# Patient Record
Sex: FEMALE | Race: BLACK OR AFRICAN AMERICAN | NOT HISPANIC OR LATINO | Employment: UNEMPLOYED | URBAN - METROPOLITAN AREA
[De-identification: names, ages, dates, MRNs, and addresses within clinical notes are randomized per-mention and may not be internally consistent; named-entity substitution may affect disease eponyms.]

---

## 2018-08-15 ENCOUNTER — NURSE TRIAGE (OUTPATIENT)
Dept: ADMINISTRATIVE | Facility: CLINIC | Age: 23
End: 2018-08-15

## 2018-08-16 NOTE — TELEPHONE ENCOUNTER
"    Reason for Disposition   [1] SEVERE neck pain (e.g., excruciating, unable to do any normal activities) AND [2] not improved after 2 hours of pain medicine    Answer Assessment - Initial Assessment Questions  1. ONSET: "When did the pain begin?"       About 2 wks ago  2. LOCATION: "Where does it hurt?"       Right side   3. PATTERN "Does the pain come and go, or has it been constant since it started?"       intermittent  4. SEVERITY: "How bad is the pain?"  (Scale 1-10; or mild, moderate, severe)    - MILD (1-3): doesn't interfere with normal activities     - MODERATE (4-7): interferes with normal activities or awakens from sleep     - SEVERE (8-10):  excruciating pain, unable to do any normal activities       8/10 when she has pain- other times no pain  5. RADIATION: "Does the pain go anywhere else, shoot into your arms?"      No   6. CORD SYMPTOMS: "Any weakness or numbness of the arms or legs?"      none  7. CAUSE: "What do you think is causing the neck pain?"      Not sure- was on a cruise, day 3 felt like her neck needed to be popped - she has popped  Her neck in the past but this time no relief given  8. NECK OVERUSE: "Any recent activities that involved turning or twisting the neck?"      Not sure  9. OTHER SYMPTOMS: "Do you have any other symptoms?" (e.g., headache, fever, chest pain, difficulty breathing, neck swelling)      none  10. PREGNANCY: "Is there any chance you are pregnant?" "When was your last menstrual period?"        No  Pt reported no pcp- currently has severe pain.    Protocols used: ST NECK PAIN OR [id number]      "

## 2020-03-25 ENCOUNTER — TELEPHONE (OUTPATIENT)
Dept: OBSTETRICS AND GYNECOLOGY | Facility: CLINIC | Age: 25
End: 2020-03-25

## 2020-03-25 DIAGNOSIS — Z36.3 ENCOUNTER FOR ANTENATAL SCREENING FOR MALFORMATION USING ULTRASOUND: Primary | ICD-10-CM

## 2020-03-30 ENCOUNTER — TELEPHONE (OUTPATIENT)
Dept: OBSTETRICS AND GYNECOLOGY | Facility: CLINIC | Age: 25
End: 2020-03-30

## 2020-03-30 NOTE — TELEPHONE ENCOUNTER
----- Message from Nighat Dumont sent at 3/30/2020  3:51 PM CDT -----  Contact: BARRY ORANTES [[id number]]  Name of Who is Calling: BARRY ORANTES [[id number]]      What is the request in detail: Pt is calling to speak to staff in regards to an over the counter meds for vomiting  ... Please call to further assist .       Can the clinic reply by [facility]: N       What Number to Call Back [facility] not [facility]: [phone]

## 2020-03-31 ENCOUNTER — HOSPITAL ENCOUNTER (EMERGENCY)
Facility: OTHER | Age: 25
Discharge: HOME | End: 2020-04-01
Attending: EMERGENCY MEDICINE
Payer: MEDICAID

## 2020-03-31 DIAGNOSIS — E87.6 HYPOKALEMIA: ICD-10-CM

## 2020-03-31 DIAGNOSIS — O20.0 THREATENED MISCARRIAGE IN EARLY PREGNANCY: Primary | ICD-10-CM

## 2020-03-31 DIAGNOSIS — O30.001 TWIN GESTATION IN FIRST TRIMESTER, UNSPECIFIED MULTIPLE GESTATION TYPE: ICD-10-CM

## 2020-03-31 LAB
ABO + RH BLD: NORMAL
ALBUMIN SERPL BCP-MCNC: 4.2 G/DL (ref 3.5–5.2)
ALP SERPL-CCNC: 78 U/L (ref 55–135)
ALT SERPL W/O P-5'-P-CCNC: 16 U/L (ref 10–44)
ANION GAP SERPL CALC-SCNC: 11 MMOL/L (ref 8–16)
AST SERPL-CCNC: 18 U/L (ref 10–40)
B-HCG UR QL: POSITIVE
BASOPHILS # BLD AUTO: 0.02 K/UL (ref 0–0.2)
BASOPHILS NFR BLD: 0.3 % (ref 0–1.9)
BILIRUB SERPL-MCNC: 0.9 MG/DL (ref 0.1–1)
BUN SERPL-MCNC: 6 MG/DL (ref 6–20)
CALCIUM SERPL-MCNC: 9.9 MG/DL (ref 8.7–10.5)
CHLORIDE SERPL-SCNC: 104 MMOL/L (ref 95–110)
CO2 SERPL-SCNC: 23 MMOL/L (ref 23–29)
CREAT SERPL-MCNC: 0.7 MG/DL (ref 0.5–1.4)
CTP QC/QA: YES
DIFFERENTIAL METHOD: ABNORMAL
EOSINOPHIL # BLD AUTO: 0.1 K/UL (ref 0–0.5)
EOSINOPHIL NFR BLD: 1.1 % (ref 0–8)
ERYTHROCYTE [DISTWIDTH] IN BLOOD BY AUTOMATED COUNT: 12.4 % (ref 11.5–14.5)
EST. GFR  (AFRICAN AMERICAN): >60 ML/MIN/1.73 M^2
EST. GFR  (NON AFRICAN AMERICAN): >60 ML/MIN/1.73 M^2
GLUCOSE SERPL-MCNC: 89 MG/DL (ref 70–110)
HCG INTACT+B SERPL-ACNC: NORMAL MIU/ML
HCT VFR BLD AUTO: 44.9 % (ref 37–48.5)
HGB BLD-MCNC: 15.1 G/DL (ref 12–16)
IMM GRANULOCYTES # BLD AUTO: 0.01 K/UL (ref 0–0.04)
IMM GRANULOCYTES NFR BLD AUTO: 0.2 % (ref 0–0.5)
LYMPHOCYTES # BLD AUTO: 2.4 K/UL (ref 1–4.8)
LYMPHOCYTES NFR BLD: 35.8 % (ref 18–48)
MCH RBC QN AUTO: 30.1 PG (ref 27–31)
MCHC RBC AUTO-ENTMCNC: 33.6 G/DL (ref 32–36)
MCV RBC AUTO: 89 FL (ref 82–98)
MONOCYTES # BLD AUTO: 0.6 K/UL (ref 0.3–1)
MONOCYTES NFR BLD: 9.3 % (ref 4–15)
NEUTROPHILS # BLD AUTO: 3.5 K/UL (ref 1.8–7.7)
NEUTROPHILS NFR BLD: 53.3 % (ref 38–73)
NRBC BLD-RTO: 0 /100 WBC
PLATELET # BLD AUTO: 374 K/UL (ref 150–350)
PMV BLD AUTO: 9.1 FL (ref 9.2–12.9)
POTASSIUM SERPL-SCNC: 3.2 MMOL/L (ref 3.5–5.1)
PROT SERPL-MCNC: 8.4 G/DL (ref 6–8.4)
RBC # BLD AUTO: 5.02 M/UL (ref 4–5.4)
SODIUM SERPL-SCNC: 138 MMOL/L (ref 136–145)
WBC # BLD AUTO: 6.64 K/UL (ref 3.9–12.7)

## 2020-03-31 PROCEDURE — 96361 HYDRATE IV INFUSION ADD-ON: CPT

## 2020-03-31 PROCEDURE — 63600175 PHARM REV CODE 636 W HCPCS: Performed by: PHYSICIAN ASSISTANT

## 2020-03-31 PROCEDURE — 85025 COMPLETE CBC W/AUTO DIFF WBC: CPT

## 2020-03-31 PROCEDURE — 81025 URINE PREGNANCY TEST: CPT | Performed by: EMERGENCY MEDICINE

## 2020-03-31 PROCEDURE — 84702 CHORIONIC GONADOTROPIN TEST: CPT

## 2020-03-31 PROCEDURE — 80053 COMPREHEN METABOLIC PANEL: CPT

## 2020-03-31 PROCEDURE — 99284 EMERGENCY DEPT VISIT MOD MDM: CPT | Mod: 25

## 2020-03-31 PROCEDURE — 25000003 PHARM REV CODE 250: Performed by: PHYSICIAN ASSISTANT

## 2020-03-31 PROCEDURE — 96376 TX/PRO/DX INJ SAME DRUG ADON: CPT

## 2020-03-31 PROCEDURE — 96365 THER/PROPH/DIAG IV INF INIT: CPT

## 2020-03-31 PROCEDURE — 86901 BLOOD TYPING SEROLOGIC RH(D): CPT

## 2020-03-31 PROCEDURE — 96375 TX/PRO/DX INJ NEW DRUG ADDON: CPT

## 2020-03-31 RX ORDER — ONDANSETRON 2 MG/ML
4 INJECTION INTRAMUSCULAR; INTRAVENOUS
Status: COMPLETED | OUTPATIENT
Start: 2020-03-31 | End: 2020-03-31

## 2020-03-31 RX ADMIN — ONDANSETRON 4 MG: 2 INJECTION INTRAMUSCULAR; INTRAVENOUS at 09:03

## 2020-03-31 RX ADMIN — SODIUM CHLORIDE 1000 ML: 0.9 INJECTION, SOLUTION INTRAVENOUS at 09:03

## 2020-03-31 RX ADMIN — PROMETHAZINE HYDROCHLORIDE 25 MG: 25 INJECTION INTRAMUSCULAR; INTRAVENOUS at 10:03

## 2020-04-01 VITALS
OXYGEN SATURATION: 99 % | HEIGHT: 62 IN | RESPIRATION RATE: 18 BRPM | BODY MASS INDEX: 49.69 KG/M2 | DIASTOLIC BLOOD PRESSURE: 64 MMHG | HEART RATE: 86 BPM | SYSTOLIC BLOOD PRESSURE: 126 MMHG | WEIGHT: 270 LBS | TEMPERATURE: 98 F

## 2020-04-01 PROCEDURE — 96366 THER/PROPH/DIAG IV INF ADDON: CPT

## 2020-04-01 RX ORDER — PROMETHAZINE HYDROCHLORIDE 25 MG/1
25 SUPPOSITORY RECTAL EVERY 6 HOURS PRN
Qty: 12 SUPPOSITORY | Refills: 0 | Status: SHIPPED | OUTPATIENT
Start: 2020-04-01 | End: 2020-04-16

## 2020-04-01 NOTE — ED PROVIDER NOTES
Encounter Date: 3/31/2020       History     Chief Complaint   Patient presents with    Emesis During Pregnancy     x3 days. states unable to keep anything down. Taking zofran without relief. States 6 weeks pregnant    Vaginal Bleeding     since today     Patient is a 24-year-old female  approximately 6 weeks pregnant who presents to the emergency department with vaginal bleeding nausea and vomiting.  Patient states over the last several days she has had worsening nausea and vomiting.  She states she is vomiting several times a day.  She states today she began to have vaginal bleeding.  She states she has gone through 3 pads.  She denies abdominal pain or pelvic cramping.  She denies fevers or chills.  She denies urinary symptoms.    The history is provided by the patient.     Review of patient's allergies indicates:  No Known Allergies  Past Medical History:   Diagnosis Date    Anemia     Asthma complicating pregnancy, antepartum 2015     No past surgical history on file.  Family History   Problem Relation Age of Onset    Breast cancer Neg Hx     Colon cancer Neg Hx     Ovarian cancer Neg Hx      Social History     Tobacco Use    Smoking status: Never Smoker    Smokeless tobacco: Never Used   Substance Use Topics    Alcohol use: No    Drug use: No     Review of Systems   Constitutional: Positive for activity change, appetite change and fatigue. Negative for chills and fever.   HENT: Negative for congestion, ear discharge, ear pain, postnasal drip, rhinorrhea, sore throat and trouble swallowing.    Respiratory: Negative for cough and shortness of breath.    Cardiovascular: Negative for chest pain.   Gastrointestinal: Positive for nausea and vomiting. Negative for abdominal pain, blood in stool, constipation and diarrhea.   Genitourinary: Positive for vaginal bleeding. Negative for dysuria, flank pain, hematuria and vaginal discharge.   Musculoskeletal: Negative for back pain, neck pain and neck  stiffness.   Skin: Negative for rash and wound.   Neurological: Negative for dizziness, weakness, light-headedness and headaches.       Physical Exam     Initial Vitals [03/31/20 2028]   BP Pulse Resp Temp SpO2   117/76 85 16 98 °F (36.7 °C) (!) 94 %      MAP       --         Physical Exam    Nursing note and vitals reviewed.  Constitutional: She appears well-developed and well-nourished. She is not diaphoretic. She is Obese .  Non-toxic appearance. No distress.   HENT:   Head: Normocephalic.   Right Ear: Hearing and external ear normal.   Left Ear: Hearing and external ear normal.   Nose: Nose normal.   Mouth/Throat: Uvula is midline, oropharynx is clear and moist and mucous membranes are normal. No oropharyngeal exudate.   Eyes: Conjunctivae are normal. Pupils are equal, round, and reactive to light.   Neck: Normal range of motion.   Cardiovascular: Normal rate and regular rhythm.   Pulmonary/Chest: Breath sounds normal.   Abdominal: Soft. Bowel sounds are normal.   Genitourinary:   Genitourinary Comments: No blood in vault.  No CMT.  No adnexal tenderness.   Lymphadenopathy:     She has no cervical adenopathy.   Neurological: She is alert and oriented to person, place, and time.   Skin: Skin is warm and dry. Capillary refill takes less than 2 seconds.   Psychiatric: She has a normal mood and affect.         ED Course   Procedures  Labs Reviewed   CBC W/ AUTO DIFFERENTIAL - Abnormal; Notable for the following components:       Result Value    Platelets 374 (*)     MPV 9.1 (*)     All other components within normal limits   COMPREHENSIVE METABOLIC PANEL - Abnormal; Notable for the following components:    Potassium 3.2 (*)     All other components within normal limits   POCT URINE PREGNANCY - Abnormal; Notable for the following components:    POC Preg Test, Ur Positive (*)     All other components within normal limits   HCG, QUANTITATIVE, PREGNANCY   GROUP & RH          Imaging Results          US OB <14 Wks  TransAbd & TransVag, Twin Gestation (xpd) (Final result)  Result time 04/01/20 00:10:34   Procedure changed from US OB <14 Wks TransAbd & TransVag, Single Gestation (XPD)     Final result by Jose Brennan MD (04/01/20 00:10:34)                 Impression:      Early twin intrauterine pregnancy noted, as discussed above, continued follow-up is recommended.      Electronically signed by: Jose Brennan  Date:    04/01/2020  Time:    00:10             Narrative:    EXAMINATION:  US OB <14 WEEKS TRANSABDOM & TRANSVAG, TWIN GESTATION (XPD)    CLINICAL HISTORY:  Vag Bleeding;    TECHNIQUE:  Transabdominal and transvaginal obstetrical ultrasound was performed.    COMPARISON:  None    FINDINGS:  The technologist indicates the examination was technically difficult due to patient body habitus.  The uterus measures approximately 11.8 cm in length.  On submitted images there is a small hypoechoic focus at the cervix that may relate to a small nabothian cyst.  Twin intrauterine pregnancy is noted.  Two gestational sacs are noted.  Gestational sac A demonstrates a mean sac diameter of 0.93 cm corresponding to a calculated gestational age of 5 weeks 4 days, a yolk sac is noted, a fetal pole not well delineated at this time.  Gestational sac B demonstrates a mean sac diameter of 1.4 cm corresponding to a calculated gestational age of 6 weeks 2 days, fetal pole is noted with a crown-rump length measurement corresponding to approximate gestational age of 5 weeks 6 days.  Fetal heart rate is not well delineated on M-mode imaging however the technologist indicates identification of fetal heart rate of baby B on real-time imaging.    The maternal right ovary measures approximately 2.9 x 3.6 x 3.4 cm in size, a small follicle is noted.  The left ovary measures approximately 1.4 x 3.4 x 2.2 cm in size.  Color Doppler imaging demonstrates evidence for flow to both ovaries.  There is no additional sonographic evidence for pelvic mass  or cystic collection.                                 Medical Decision Making:   Initial Assessment:   Urgent evaluation of a 24-year-old female  approximately 6 weeks pregnant who presents to the emergency department with nausea and vomiting and vaginal bleeding.  Patient is afebrile, nontoxic appearing, and hemodynamically stable.  Benign abdominal exam.  Labs will be obtained to evaluate kidney function and electrolytes.  Patient will be given IV hydration and antiemetics.  Will also perform ectopic workup to ensure no ectopic pregnancy with patient's vaginal bleeding.  Clinical Tests:   Lab Tests: Ordered and Reviewed  Radiological Study: Ordered and Reviewed  ED Management:  12:18 AM  Patient is Rh positive, so RhoGAM is not warranted.  On pelvic exam, there is no blood in vaginal vault.  Cervix is closed.  No products of conception.  Pt is slightly hypokalemic.  Will advise to replace orally outpatient with dietary changes. All other labs are unremarkable.  Ultrasound reveals early twin IUP.  Patient is given bleeding precautions.  She is advised to follow up with OBGYN at scheduled appointment in April.  Patient is advised to return to the emergency department with any worsening symptoms or concerns.                                 Clinical Impression:       ICD-10-CM ICD-9-CM   1. Threatened miscarriage in early pregnancy O20.0 640.03   2. Twin gestation in first trimester, unspecified multiple gestation type O30.001 651.03   3. Hypokalemia E87.6 276.8             ED Disposition Condition    Discharge Stable        ED Prescriptions     None        Follow-up Information    None                                    Letty Dennis PA-C  20 0020       Letty Dennis PA-C  20 0021

## 2020-04-01 NOTE — ED TRIAGE NOTES
Pt to ED with c/o emesis x3 days and vaginal bleeding that began today.  Pt reports 6 weeks pregnant [facility] in mid February pt took pregnancy test 3 days reports +test.  Pt states this is her 3rd pregnancy with one prior miscarriage.  Reports vaginal bleeding is red in color and soaking through 3 pads today.  Pt reports taking Zofran at home with no relief of vomiting.  Pt denies any other symptoms at this time.     Patient identifiers for Lilyia Moore checked and correct   LOC: Patient is awake, alert, and aware of environment with an appropriate affect.  Patient is oriented x4 and speaking appropriately.  APPEARANCE: Patient resting comfortably and in no acute distress.  Patient is clean and well groomed, patient's clothing is properly fastened.  SKIN: The skin is warm and dry.  Patient has normal skin turgor and moist mucus membranes.  Skin is intact: no bruising or breakdown noted.  MUSCULOSKELETAL: Patient is moving all extremities well, no obvious swelling or deformities noted. Pulses intact.  RESPIRATORY: Airway is open and patent.  Respirations are spontaneous, even and unlabored.  Normal effort and rate noted.  CARDIAC: Patient has a normal rate and rhythm.  No peripheral edema noted.  Capillary refill < 3 seconds.  ABDOMEN: No abd distention noted.  Bowel sounds active in all 4 quadrants.  Soft and non-tender upon palpation.  NEUROLOGICAL: PERRLA.  Facial expression is symmetrical.  Hand grasps are equal bilaterally.  Normal sensation in all extremities when touched with finger.  Following commands appropriately.

## 2020-04-08 ENCOUNTER — HOSPITAL ENCOUNTER (EMERGENCY)
Facility: OTHER | Age: 25
Discharge: HOME | End: 2020-04-08
Attending: EMERGENCY MEDICINE
Payer: MEDICAID

## 2020-04-08 ENCOUNTER — NURSE TRIAGE (OUTPATIENT)
Dept: ADMINISTRATIVE | Facility: CLINIC | Age: 25
End: 2020-04-08

## 2020-04-08 ENCOUNTER — TELEPHONE (OUTPATIENT)
Dept: OBSTETRICS AND GYNECOLOGY | Facility: CLINIC | Age: 25
End: 2020-04-08

## 2020-04-08 VITALS
WEIGHT: 275 LBS | RESPIRATION RATE: 18 BRPM | OXYGEN SATURATION: 99 % | TEMPERATURE: 99 F | BODY MASS INDEX: 50.61 KG/M2 | HEIGHT: 62 IN | HEART RATE: 84 BPM | DIASTOLIC BLOOD PRESSURE: 55 MMHG | SYSTOLIC BLOOD PRESSURE: 125 MMHG

## 2020-04-08 DIAGNOSIS — E86.0 DEHYDRATION: ICD-10-CM

## 2020-04-08 DIAGNOSIS — O21.9 NAUSEA AND VOMITING DURING PREGNANCY: Primary | ICD-10-CM

## 2020-04-08 DIAGNOSIS — O21.9 NAUSEA/VOMITING IN PREGNANCY: Primary | ICD-10-CM

## 2020-04-08 LAB
ALBUMIN SERPL BCP-MCNC: 3.8 G/DL (ref 3.5–5.2)
ALP SERPL-CCNC: 64 U/L (ref 55–135)
ALT SERPL W/O P-5'-P-CCNC: 29 U/L (ref 10–44)
ANION GAP SERPL CALC-SCNC: 10 MMOL/L (ref 8–16)
AST SERPL-CCNC: 18 U/L (ref 10–40)
BASOPHILS # BLD AUTO: 0.01 K/UL (ref 0–0.2)
BASOPHILS NFR BLD: 0.1 % (ref 0–1.9)
BILIRUB SERPL-MCNC: 0.6 MG/DL (ref 0.1–1)
BILIRUB UR QL STRIP: NEGATIVE
BUN SERPL-MCNC: 5 MG/DL (ref 6–20)
CALCIUM SERPL-MCNC: 9.4 MG/DL (ref 8.7–10.5)
CHLORIDE SERPL-SCNC: 104 MMOL/L (ref 95–110)
CLARITY UR: CLEAR
CO2 SERPL-SCNC: 24 MMOL/L (ref 23–29)
COLOR UR: YELLOW
CREAT SERPL-MCNC: 0.7 MG/DL (ref 0.5–1.4)
DIFFERENTIAL METHOD: ABNORMAL
EOSINOPHIL # BLD AUTO: 0 K/UL (ref 0–0.5)
EOSINOPHIL NFR BLD: 0.4 % (ref 0–8)
ERYTHROCYTE [DISTWIDTH] IN BLOOD BY AUTOMATED COUNT: 12 % (ref 11.5–14.5)
EST. GFR  (AFRICAN AMERICAN): >60 ML/MIN/1.73 M^2
EST. GFR  (NON AFRICAN AMERICAN): >60 ML/MIN/1.73 M^2
GLUCOSE SERPL-MCNC: 84 MG/DL (ref 70–110)
GLUCOSE UR QL STRIP: NEGATIVE
HCT VFR BLD AUTO: 39.4 % (ref 37–48.5)
HGB BLD-MCNC: 13.4 G/DL (ref 12–16)
HGB UR QL STRIP: NEGATIVE
IMM GRANULOCYTES # BLD AUTO: 0.01 K/UL (ref 0–0.04)
IMM GRANULOCYTES NFR BLD AUTO: 0.1 % (ref 0–0.5)
KETONES UR QL STRIP: ABNORMAL
LEUKOCYTE ESTERASE UR QL STRIP: NEGATIVE
LIPASE SERPL-CCNC: 32 U/L (ref 4–60)
LYMPHOCYTES # BLD AUTO: 1.5 K/UL (ref 1–4.8)
LYMPHOCYTES NFR BLD: 21.3 % (ref 18–48)
MCH RBC QN AUTO: 30 PG (ref 27–31)
MCHC RBC AUTO-ENTMCNC: 34 G/DL (ref 32–36)
MCV RBC AUTO: 88 FL (ref 82–98)
MONOCYTES # BLD AUTO: 0.7 K/UL (ref 0.3–1)
MONOCYTES NFR BLD: 9.2 % (ref 4–15)
NEUTROPHILS # BLD AUTO: 4.8 K/UL (ref 1.8–7.7)
NEUTROPHILS NFR BLD: 68.9 % (ref 38–73)
NITRITE UR QL STRIP: NEGATIVE
NRBC BLD-RTO: 0 /100 WBC
PH UR STRIP: 6 [PH] (ref 5–8)
PLATELET # BLD AUTO: 311 K/UL (ref 150–350)
PMV BLD AUTO: 9.1 FL (ref 9.2–12.9)
POTASSIUM SERPL-SCNC: 3.5 MMOL/L (ref 3.5–5.1)
PROT SERPL-MCNC: 7.6 G/DL (ref 6–8.4)
PROT UR QL STRIP: NEGATIVE
RBC # BLD AUTO: 4.46 M/UL (ref 4–5.4)
SODIUM SERPL-SCNC: 138 MMOL/L (ref 136–145)
SP GR UR STRIP: 1.02 (ref 1–1.03)
URN SPEC COLLECT METH UR: ABNORMAL
UROBILINOGEN UR STRIP-ACNC: NEGATIVE EU/DL
WBC # BLD AUTO: 7.03 K/UL (ref 3.9–12.7)

## 2020-04-08 PROCEDURE — 85025 COMPLETE CBC W/AUTO DIFF WBC: CPT

## 2020-04-08 PROCEDURE — 83690 ASSAY OF LIPASE: CPT

## 2020-04-08 PROCEDURE — 99284 EMERGENCY DEPT VISIT MOD MDM: CPT | Mod: 25

## 2020-04-08 PROCEDURE — 96374 THER/PROPH/DIAG INJ IV PUSH: CPT

## 2020-04-08 PROCEDURE — 81003 URINALYSIS AUTO W/O SCOPE: CPT

## 2020-04-08 PROCEDURE — 96375 TX/PRO/DX INJ NEW DRUG ADDON: CPT

## 2020-04-08 PROCEDURE — 80053 COMPREHEN METABOLIC PANEL: CPT

## 2020-04-08 PROCEDURE — 25000003 PHARM REV CODE 250: Performed by: PHYSICIAN ASSISTANT

## 2020-04-08 PROCEDURE — 63600175 PHARM REV CODE 636 W HCPCS: Performed by: PHYSICIAN ASSISTANT

## 2020-04-08 PROCEDURE — S0028 INJECTION, FAMOTIDINE, 20 MG: HCPCS | Performed by: PHYSICIAN ASSISTANT

## 2020-04-08 PROCEDURE — 96361 HYDRATE IV INFUSION ADD-ON: CPT

## 2020-04-08 RX ORDER — DIPHENHYDRAMINE HYDROCHLORIDE 50 MG/ML
12.5 INJECTION INTRAMUSCULAR; INTRAVENOUS
Status: COMPLETED | OUTPATIENT
Start: 2020-04-08 | End: 2020-04-08

## 2020-04-08 RX ORDER — METOCLOPRAMIDE HYDROCHLORIDE 5 MG/ML
10 INJECTION INTRAMUSCULAR; INTRAVENOUS
Status: COMPLETED | OUTPATIENT
Start: 2020-04-08 | End: 2020-04-08

## 2020-04-08 RX ORDER — METOCLOPRAMIDE 10 MG/1
10 TABLET ORAL EVERY 6 HOURS PRN
Qty: 18 TABLET | Refills: 0 | Status: SHIPPED | OUTPATIENT
Start: 2020-04-08 | End: 2020-04-16 | Stop reason: SDUPTHER

## 2020-04-08 RX ORDER — FAMOTIDINE 10 MG/ML
20 INJECTION INTRAVENOUS
Status: COMPLETED | OUTPATIENT
Start: 2020-04-08 | End: 2020-04-08

## 2020-04-08 RX ORDER — PROMETHAZINE HYDROCHLORIDE 25 MG/1
25 SUPPOSITORY RECTAL EVERY 6 HOURS PRN
Qty: 12 SUPPOSITORY | Refills: 1 | Status: SHIPPED | OUTPATIENT
Start: 2020-04-08 | End: 2020-04-16

## 2020-04-08 RX ADMIN — FAMOTIDINE 20 MG: 10 INJECTION INTRAVENOUS at 06:04

## 2020-04-08 RX ADMIN — DIPHENHYDRAMINE HYDROCHLORIDE 12.5 MG: 50 INJECTION, SOLUTION INTRAMUSCULAR; INTRAVENOUS at 06:04

## 2020-04-08 RX ADMIN — SODIUM CHLORIDE 1000 ML: 0.9 INJECTION, SOLUTION INTRAVENOUS at 07:04

## 2020-04-08 RX ADMIN — METOCLOPRAMIDE 10 MG: 5 INJECTION, SOLUTION INTRAMUSCULAR; INTRAVENOUS at 06:04

## 2020-04-08 NOTE — TELEPHONE ENCOUNTER
----- Message from Trena Funes NP sent at 4/8/2020  2:45 PM CDT -----  Phenergan suppositories sent. If she cannot keep anything down with this - will need to go to EDfor IV hydration.

## 2020-04-08 NOTE — ED NOTES
"Pt reporting she is 7 weeks pregnant, "they told me it was twins last time I was here"; c/o weakness, nausea, vomiting x several days. Has tried taking b6 and Zofran orally and phenergan rectal suppository without relief. No active vomiting at this time. Denies vaginal bleeding or abd cramping. Pt AAOx4 and appropriate at this time. Respirations even and unlabored. No acute distress noted.      "

## 2020-04-08 NOTE — ED PROVIDER NOTES
"Encounter Date: 2020       History     Chief Complaint   Patient presents with    Emesis During Pregnancy     7 weeks pregnant, nausea, vomiting x several days, states 'zofran, b6 is not working." Denies vag bleeding or abd cramping.      Patient is a 24-year-old  female, approximately 7 weeks patient age, with anemia and asthma presenting to the emergency department with vomiting.  Patient reports she has had intractable nausea and vomiting for the past 3 days.  She has tried vitamin B6, promethazine (oral and suppository) and Zofran without relief.  She is not even able to tolerate water.  She reports mild upper abdominal discomfort after vomiting.  She denies fever, diarrhea, cough, or shortness of breath.  She denies dysuria, vaginal bleeding or pelvic pain.    The history is provided by the patient.     Review of patient's allergies indicates:  No Known Allergies  Past Medical History:   Diagnosis Date    Anemia     Asthma complicating pregnancy, antepartum 2015     No past surgical history on file.  Family History   Problem Relation Age of Onset    Breast cancer Neg Hx     Colon cancer Neg Hx     Ovarian cancer Neg Hx      Social History     Tobacco Use    Smoking status: Never Smoker    Smokeless tobacco: Never Used   Substance Use Topics    Alcohol use: No    Drug use: No     Review of Systems   Constitutional: Positive for fatigue (And generalized weakness). Negative for chills and fever.   HENT: Negative for congestion and sore throat.    Respiratory: Negative for cough.    Cardiovascular: Negative for chest pain.   Gastrointestinal: Positive for nausea and vomiting. Negative for abdominal pain and diarrhea.   Genitourinary: Negative for dysuria, pelvic pain and vaginal bleeding.   Musculoskeletal: Negative for arthralgias.   Skin: Negative for rash.   Allergic/Immunologic: Negative for immunocompromised state.   Neurological: Negative for headaches.       Physical Exam     Initial " Vitals [04/08/20 1808]   BP Pulse Resp Temp SpO2   135/71 86 20 98.3 °F (36.8 °C) 97 %      MAP       --         Physical Exam    Constitutional: Vital signs are normal. She is Obese . She is cooperative.  Non-toxic appearance. She does not have a sickly appearance. No distress.   HENT:   Head: Normocephalic and atraumatic.   Mildly dry oral mucosa   Eyes: Conjunctivae and EOM are normal.   Neck: Normal range of motion. Neck supple.   Cardiovascular: Normal rate, regular rhythm and intact distal pulses.   Pulmonary/Chest: Breath sounds normal. No respiratory distress. She has no wheezes. She has no rales.   Abdominal: Soft. Bowel sounds are normal. There is no tenderness.   Musculoskeletal: Normal range of motion.   Neurological: She is alert and oriented to person, place, and time. GCS eye subscore is 4. GCS verbal subscore is 5. GCS motor subscore is 6.   Skin: Skin is warm and dry. No rash noted.         ED Course   Procedures  Labs Reviewed   CBC W/ AUTO DIFFERENTIAL - Abnormal; Notable for the following components:       Result Value    MPV 9.1 (*)     All other components within normal limits   COMPREHENSIVE METABOLIC PANEL - Abnormal; Notable for the following components:    BUN, Bld 5 (*)     All other components within normal limits   URINALYSIS, REFLEX TO URINE CULTURE - Abnormal; Notable for the following components:    Ketones, UA 2+ (*)     All other components within normal limits    Narrative:     Preferred Collection Type->Urine, Clean Catch   LIPASE          Imaging Results    None          Medical Decision Making:   History:   Old Records Summarized: other records.       <> Summary of Records: ED visit on 04/01/2020:  Ob ultrasound less than 14 weeks revealed Early twin intrauterine pregnancy noted.  Initial Assessment:   Urgent evaluation of a 24 y.o. female presenting to the emergency department complaining of vomiting during pregnancy. Patient is afebrile, nontoxic appearing and hemodynamically  stable.  Patient with stable vital signs.  She appears mildly dry on exam.  Has tried multiple antiemetics at home without relief.  Will provide patient with IV fluids, IV antiemetics and reassess.  ED Management:  Blood work reveals no acute metabolic disturbance.  Urinalysis reveals no infection, notable for 2+ ketones.   Patient's symptoms improved.  She tolerated p.o. without difficulty.  She did not want to stay for 2nd L of IV fluids.  She will be sent home with Reglan.  Patient was given strict return precautions to the emergency department.                                 Clinical Impression:     1. Nausea and vomiting during pregnancy    2. Dehydration                               Ja Vines PA-C  04/08/20 8519

## 2020-04-08 NOTE — TELEPHONE ENCOUNTER
----- Message from Hortencia Cameron MA sent at 4/8/2020  1:59 PM CDT -----  Patient states she is having trouble keeping any foods or liquids down. Has tried Zofran, Phenergan, and all other options in A To Z book. Will like to know if there is anything else she can do.  ----- Message -----  From: Blessing Villarreal  Sent: 4/8/2020   1:33 PM CDT  To: [facility] RAMIREZ Staff    Type :  Needs Medical Advice    Who Called: Keondria  Symptoms (please be specific): pt not able to keep any food down    How long has patient had these symptoms:  3 days  Pharmacy name and phone #:  no  Would the patient rather a call back or a response via [facility]? call  Best Call Back Number: [phone]  Additional Information: pt need an appt or a call

## 2020-04-08 NOTE — TELEPHONE ENCOUNTER
----- Message from Hortencia Cameron MA sent at 4/8/2020  1:59 PM CDT -----  Patient states she is having trouble keeping any foods or liquids down. Has tried Zofran, Phenergan, and all other options in A To Z book. Will like to know if there is anything else she can do.  ----- Message -----  From: Blessing Villarreal  Sent: 4/8/2020   1:33 PM CDT  To: [facility] Staff    Type :  Needs Medical Advice    Who Called: Keondria  Symptoms (please be specific): pt not able to keep any food down    How long has patient had these symptoms:  3 days  Pharmacy name and phone #:  no  Would the patient rather a call back or a response via [facility]? call  Best Call Back Number: [phone]  Additional Information: pt need an appt or a call

## 2020-04-09 NOTE — ED NOTES
Pt given 1 cup of water for PO challenge per PA request.  Pt was instructed to drink water over about 10-15 minutes.

## 2020-04-09 NOTE — ED NOTES
Received report from SURAJ Peres. Assumed care of patient at this time. Patient lying on stretcher with HOB elevated.  Pt updated on POC. RR even and unlabored. No acute distress noted.  Bed in lowest position, call light within reach.  Will continue to monitor.

## 2020-04-09 NOTE — ED NOTES
Pt rounding complete.  Pain 3/10.  Restroom and comfort needs addressed.  Pt updated on plan of care.  Call light within reach.  Will continue to monitor. Pt states nausea is relieved. Pt not requesting anything medication at this time.

## 2020-04-16 ENCOUNTER — OFFICE VISIT (OUTPATIENT)
Dept: OBSTETRICS AND GYNECOLOGY | Facility: CLINIC | Age: 25
End: 2020-04-16
Payer: MEDICAID

## 2020-04-16 ENCOUNTER — LAB VISIT (OUTPATIENT)
Dept: LAB | Facility: OTHER | Age: 25
End: 2020-04-16
Attending: NURSE PRACTITIONER
Payer: MEDICAID

## 2020-04-16 ENCOUNTER — PROCEDURE VISIT (OUTPATIENT)
Dept: OBSTETRICS AND GYNECOLOGY | Facility: CLINIC | Age: 25
End: 2020-04-16
Attending: OBSTETRICS & GYNECOLOGY
Payer: MEDICAID

## 2020-04-16 ENCOUNTER — PATIENT MESSAGE (OUTPATIENT)
Dept: OBSTETRICS AND GYNECOLOGY | Facility: CLINIC | Age: 25
End: 2020-04-16

## 2020-04-16 ENCOUNTER — TELEPHONE (OUTPATIENT)
Dept: OBSTETRICS AND GYNECOLOGY | Facility: CLINIC | Age: 25
End: 2020-04-16

## 2020-04-16 VITALS
HEIGHT: 62 IN | BODY MASS INDEX: 49.49 KG/M2 | SYSTOLIC BLOOD PRESSURE: 118 MMHG | WEIGHT: 268.94 LBS | DIASTOLIC BLOOD PRESSURE: 76 MMHG

## 2020-04-16 DIAGNOSIS — O21.9 NAUSEA/VOMITING IN PREGNANCY: ICD-10-CM

## 2020-04-16 DIAGNOSIS — O99.519 ASTHMA DURING PREGNANCY: ICD-10-CM

## 2020-04-16 DIAGNOSIS — Z34.80 SUPERVISION OF OTHER NORMAL PREGNANCY: ICD-10-CM

## 2020-04-16 DIAGNOSIS — Z34.80 SUPERVISION OF OTHER NORMAL PREGNANCY: Primary | ICD-10-CM

## 2020-04-16 DIAGNOSIS — O30.041 DICHORIONIC DIAMNIOTIC TWIN PREGNANCY IN FIRST TRIMESTER: ICD-10-CM

## 2020-04-16 DIAGNOSIS — J45.909 ASTHMA DURING PREGNANCY: ICD-10-CM

## 2020-04-16 DIAGNOSIS — Z36.3 ENCOUNTER FOR ANTENATAL SCREENING FOR MALFORMATION USING ULTRASOUND: ICD-10-CM

## 2020-04-16 DIAGNOSIS — Z32.01 POSITIVE PREGNANCY TEST: ICD-10-CM

## 2020-04-16 LAB
ABO + RH BLD: NORMAL
B-HCG UR QL: POSITIVE
BASOPHILS # BLD AUTO: 0.01 K/UL (ref 0–0.2)
BASOPHILS NFR BLD: 0.1 % (ref 0–1.9)
BLD GP AB SCN CELLS X3 SERPL QL: NORMAL
CTP QC/QA: YES
DIFFERENTIAL METHOD: ABNORMAL
EOSINOPHIL # BLD AUTO: 0.1 K/UL (ref 0–0.5)
EOSINOPHIL NFR BLD: 1.1 % (ref 0–8)
ERYTHROCYTE [DISTWIDTH] IN BLOOD BY AUTOMATED COUNT: 11.9 % (ref 11.5–14.5)
HCT VFR BLD AUTO: 40 % (ref 37–48.5)
HGB BLD-MCNC: 13.3 G/DL (ref 12–16)
IMM GRANULOCYTES # BLD AUTO: 0.02 K/UL (ref 0–0.04)
IMM GRANULOCYTES NFR BLD AUTO: 0.3 % (ref 0–0.5)
LYMPHOCYTES # BLD AUTO: 1.7 K/UL (ref 1–4.8)
LYMPHOCYTES NFR BLD: 22.9 % (ref 18–48)
MCH RBC QN AUTO: 29.2 PG (ref 27–31)
MCHC RBC AUTO-ENTMCNC: 33.3 G/DL (ref 32–36)
MCV RBC AUTO: 88 FL (ref 82–98)
MONOCYTES # BLD AUTO: 0.9 K/UL (ref 0.3–1)
MONOCYTES NFR BLD: 12.5 % (ref 4–15)
NEUTROPHILS # BLD AUTO: 4.7 K/UL (ref 1.8–7.7)
NEUTROPHILS NFR BLD: 63.1 % (ref 38–73)
NRBC BLD-RTO: 0 /100 WBC
PLATELET # BLD AUTO: 376 K/UL (ref 150–350)
PMV BLD AUTO: 9.3 FL (ref 9.2–12.9)
RBC # BLD AUTO: 4.55 M/UL (ref 4–5.4)
RPR SER QL: NORMAL
WBC # BLD AUTO: 7.46 K/UL (ref 3.9–12.7)

## 2020-04-16 PROCEDURE — 76801 OB US < 14 WKS SINGLE FETUS: CPT | Mod: 26,S$PBB,, | Performed by: OBSTETRICS & GYNECOLOGY

## 2020-04-16 PROCEDURE — 87481 CANDIDA DNA AMP PROBE: CPT | Mod: 59

## 2020-04-16 PROCEDURE — 99203 PR OFFICE/OUTPT VISIT, NEW, LEVL III, 30-44 MIN: ICD-10-PCS | Mod: TH,S$PBB,, | Performed by: NURSE PRACTITIONER

## 2020-04-16 PROCEDURE — 36415 COLL VENOUS BLD VENIPUNCTURE: CPT

## 2020-04-16 PROCEDURE — 87661 TRICHOMONAS VAGINALIS AMPLIF: CPT

## 2020-04-16 PROCEDURE — 99999 PR PBB SHADOW E&M-EST. PATIENT-LVL III: ICD-10-PCS | Mod: PBBFAC,,, | Performed by: NURSE PRACTITIONER

## 2020-04-16 PROCEDURE — 86703 HIV-1/HIV-2 1 RESULT ANTBDY: CPT

## 2020-04-16 PROCEDURE — 87086 URINE CULTURE/COLONY COUNT: CPT

## 2020-04-16 PROCEDURE — 99999 PR PBB SHADOW E&M-EST. PATIENT-LVL III: CPT | Mod: PBBFAC,,, | Performed by: NURSE PRACTITIONER

## 2020-04-16 PROCEDURE — 86901 BLOOD TYPING SEROLOGIC RH(D): CPT

## 2020-04-16 PROCEDURE — 86762 RUBELLA ANTIBODY: CPT

## 2020-04-16 PROCEDURE — 76802 OB US < 14 WKS ADDL FETUS: CPT | Mod: PBBFAC | Performed by: OBSTETRICS & GYNECOLOGY

## 2020-04-16 PROCEDURE — 76801 OB US < 14 WKS SINGLE FETUS: CPT | Mod: PBBFAC | Performed by: OBSTETRICS & GYNECOLOGY

## 2020-04-16 PROCEDURE — 85025 COMPLETE CBC W/AUTO DIFF WBC: CPT

## 2020-04-16 PROCEDURE — 76802 OB US < 14 WKS ADDL FETUS: CPT | Mod: 26,S$PBB,, | Performed by: OBSTETRICS & GYNECOLOGY

## 2020-04-16 PROCEDURE — 99203 OFFICE O/P NEW LOW 30 MIN: CPT | Mod: TH,S$PBB,, | Performed by: NURSE PRACTITIONER

## 2020-04-16 PROCEDURE — 99213 OFFICE O/P EST LOW 20 MIN: CPT | Mod: PBBFAC,TH,25 | Performed by: NURSE PRACTITIONER

## 2020-04-16 PROCEDURE — 87340 HEPATITIS B SURFACE AG IA: CPT

## 2020-04-16 PROCEDURE — 81025 URINE PREGNANCY TEST: CPT | Mod: PBBFAC | Performed by: NURSE PRACTITIONER

## 2020-04-16 PROCEDURE — 86592 SYPHILIS TEST NON-TREP QUAL: CPT

## 2020-04-16 PROCEDURE — 87491 CHLMYD TRACH DNA AMP PROBE: CPT | Mod: 59

## 2020-04-16 PROCEDURE — 76802 PR US, OB <14WKS, TRANSABD, EA ADDL GESTATION: ICD-10-PCS | Mod: 26,S$PBB,, | Performed by: OBSTETRICS & GYNECOLOGY

## 2020-04-16 PROCEDURE — 76801 PR US, OB <14WKS, TRANSABD, SINGLE GESTATION: ICD-10-PCS | Mod: 26,S$PBB,, | Performed by: OBSTETRICS & GYNECOLOGY

## 2020-04-16 RX ORDER — .BETA.-CAROTENE, ASCORBIC ACID, CHOLECALCIFEROL, .ALPHA.-TOCOPHEROL ACETATE, DL-, THIAMINE, RIBOFLAVIN, NIACINAMIDE, PYRIDOXINE HYDROCHLORIDE, FOLIC ACID, CYANOCOBALAMIN, CALCIUM PANTOTHENATE, CALCIUM CARBONATE, FERROUS FUMARATE, ZINC OXIDE AND DOCUSATE SODIUM 1000; 100; 400; 30; 3; 3; 15; 20; 1; 12; 7; 200; 29; 20; 25 [IU]/1; MG/1; [IU]/1; MG/1; MG/1; MG/1; MG/1; MG/1; MG/1; UG/1; MG/1; MG/1; MG/1; MG/1; MG/1
TABLET ORAL
COMMUNITY
Start: 2020-04-01 | End: 2020-06-28 | Stop reason: CLARIF

## 2020-04-16 RX ORDER — ALBUTEROL SULFATE 90 UG/1
2 AEROSOL, METERED RESPIRATORY (INHALATION) EVERY 6 HOURS PRN
Qty: 18 G | Refills: 0 | Status: SHIPPED | OUTPATIENT
Start: 2020-04-16 | End: 2020-05-14

## 2020-04-16 RX ORDER — METOCLOPRAMIDE 10 MG/1
10 TABLET ORAL EVERY 6 HOURS PRN
Qty: 18 TABLET | Refills: 0 | Status: SHIPPED | OUTPATIENT
Start: 2020-04-16 | End: 2020-04-17 | Stop reason: SDUPTHER

## 2020-04-16 NOTE — PROGRESS NOTES
"  CC: Positive Pregnancy Test    HISTORY OF PRESENT ILLNESS:    Liliya Moore is a 24 y.o. female, ,  Presents today for a routine exam complaining of amenorrhea and positive home urine pregnancy test.  Patient's last menstrual period was 2020 (lmp unknown).   She is not currently on any contraception.  Reports nausea and vomiting. Reports breast tenderness. Denies vaginal bleeding and pelvic pain.  Prior C/S X 1 - full term/ uncomplicated pregnancy.   Medical h/o asthma.  Had US in ED with twin IUP.   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        ROS:  GENERAL: No weight changes. No swelling. No fatigue. No fever.  CARDIOVASCULAR: No chest pain. No shortness of breath. No leg cramps.   NEUROLOGICAL: No headaches. No vision changes.  BREASTS: No pain. No lumps. No discharge.  ABDOMEN: No pain. No diarrhea. No constipation.  REPRODUCTIVE: No abnormal bleeding.   VULVA: No pain. No lesions. No itching.  VAGINA: No relaxation. No itching. No odor. No discharge. No lesions.  URINARY: No incontinence. No nocturia. No frequency. No dysuria.    MEDICATIONS AND ALLERGIES:  Reviewed        COMPREHENSIVE GYN HISTORY:  PAP History: Denies abnormal Paps.  Infection History: Denies STDs. Denies PID.  Benign History: Denies uterine fibroids. Denies ovarian cysts. Denies endometriosis. Denies other conditions.  Cancer History: Denies cervical cancer. Denies uterine cancer or hyperplasia. Denies ovarian cancer. Denies vulvar cancer or pre-cancer. Denies vaginal cancer or pre-cancer. Denies breast cancer. Denies colon cancer.  Sexual Activity History: Reports currently being sexually active  Menstrual History: None.  Contraception: None    /76   Ht 5' 2" (1.575 m)   Wt 122 kg (268 lb 15.4 oz)   LMP 2020 (LMP Unknown)   BMI 49.19 kg/m²     PE:  AFFECT: Calm, alert and oriented X 3. Interactive during exam  GENERAL: Appears well-nourished, well-developed, in no acute " distress.  HEAD: Normocephalic, atruamatic  TEETH: Good dentition.  THYROID: No thyromegally   BREASTS: No masses, skin changes, nipple discharge or adenopathy bilaterally.  SKIN: Normal for race, warm, & dry. No lesions or rashes.  LUNGS: Easy and unlabored, clear to auscultation bilaterally.  HEART: Regular rate and rhythm   ABDOMEN: Soft and nontender without masses or organomegally.  VULVA: No lesions, masses or tenderness.  VAGINA: Moist and well rugated without lesions or discharge.  CERVIX: Moist and pink without lesions, discharge or tenderness.      UTERUS SIZE: 10 week size, nontender and without masses.  ADNEXA: No masses or tenderness.  ESTIMATE OF PELVIC CAPACITY: Adequate  EXTREMITIES: No cyanosis, clubbing or edema. No calf tenderness.  LYMPH NODES: No axillary or inguinal adenopathy.    PROCEDURES:  UPT Positive  Genprobe  Pap- deferred per pt had WNL pap in 2019 in [place]       ASSESSMENT/PLAN:  Amenorrhea  Positive urine pregnancy test (JANES: 20, EGA: 8w2d based on LMP)    -  Routine prenatal care    Nausea and vomiting in pregnancy    -  Education regarding lifestyle and dietary modifications    -  Advised use of B6/Unisom. Pt will notify us if no relief/worsening symptoms, will consider Zofran if needed.      1st TRIMESTER COUNSELING: Discussed all, booklet provided:  Common complaints of pregnancy  HIV and other routine prenatal tests including  genetic screening  Risk factors identified by prenatal history  Oriented to practice - discussed anticipated course of prenatal care & indications for Ultrasound  Childbirth classes/Hospital facilities   Nutrition and weight gain counseling  Toxoplasmosis precautions (Cats/Raw Meat)  Sexual activity and exercise  Environmental/Work hazards  Travel  Tobacco (Ask, Advise, Assess, Assist, and Arrange), as well as alcohol and drug use  Use of any medications (Including supplements, Vitamins, Herbs, or OTC Drugs)  Domestic violence  Seat belt  use      TERATOLOGY COUNSELING:   Discussed indications and options for aneuploidy screening - pamphlets given    -  Pt declines testing  Dating US later today   FOLLOW-UP in 4 weeks with Dr. Dagoberto Funes NP    OB/GYN

## 2020-04-16 NOTE — TELEPHONE ENCOUNTER
----- Message from Hortencia Cameron MA sent at 4/16/2020  1:48 PM CDT -----  Please help schedule patient 4 week follow up. Was unable to schedule, thank you.      Schedule patient 4 week follow up. Patient verbalized and understand

## 2020-04-16 NOTE — LETTER
April 16, 2020      [facility] [address] [address]  Phone: [phone]  Fax: [phone]       Patient: Liliya Moore   YOB: 1995  Date of Visit: 04/16/2020    To Whom It May Concern:    Marilu Moore  was at [facility] on 04/16/2020. She may return to work/school on 4/22/20 with no restrictions. If you have any questions or concerns, or if I can be of further assistance, please do not hesitate to contact me.    Sincerely,      ELBA Escobedo

## 2020-04-17 ENCOUNTER — PATIENT MESSAGE (OUTPATIENT)
Dept: OBSTETRICS AND GYNECOLOGY | Facility: CLINIC | Age: 25
End: 2020-04-17

## 2020-04-17 DIAGNOSIS — O21.9 NAUSEA/VOMITING IN PREGNANCY: ICD-10-CM

## 2020-04-17 LAB
BACTERIA UR CULT: NORMAL
BACTERIA UR CULT: NORMAL
BACTERIAL VAGINOSIS DNA: POSITIVE
C TRACH DNA SPEC QL NAA+PROBE: NOT DETECTED
CANDIDA GLABRATA DNA: NEGATIVE
CANDIDA KRUSEI DNA: NEGATIVE
CANDIDA RRNA VAG QL PROBE: NEGATIVE
HBV SURFACE AG SERPL QL IA: NEGATIVE
HIV 1+2 AB+HIV1 P24 AG SERPL QL IA: NEGATIVE
N GONORRHOEA DNA SPEC QL NAA+PROBE: NOT DETECTED
RUBV IGG SER-ACNC: 13 IU/ML
RUBV IGG SER-IMP: REACTIVE
T VAGINALIS RRNA GENITAL QL PROBE: POSITIVE

## 2020-04-17 RX ORDER — METOCLOPRAMIDE 10 MG/1
10 TABLET ORAL EVERY 6 HOURS PRN
Qty: 18 TABLET | Refills: 0 | Status: SHIPPED | OUTPATIENT
Start: 2020-04-17 | End: 2020-04-17 | Stop reason: SDUPTHER

## 2020-04-17 RX ORDER — METOCLOPRAMIDE 10 MG/1
10 TABLET ORAL EVERY 6 HOURS PRN
Qty: 18 TABLET | Refills: 0 | Status: SHIPPED | OUTPATIENT
Start: 2020-04-17 | End: 2020-04-22 | Stop reason: SDUPTHER

## 2020-04-20 ENCOUNTER — TELEPHONE (OUTPATIENT)
Dept: OBSTETRICS AND GYNECOLOGY | Facility: CLINIC | Age: 25
End: 2020-04-20

## 2020-04-20 DIAGNOSIS — A59.01 TRICHOMONAL VAGINITIS DURING PREGNANCY IN FIRST TRIMESTER: ICD-10-CM

## 2020-04-20 DIAGNOSIS — O23.591 TRICHOMONAL VAGINITIS DURING PREGNANCY IN FIRST TRIMESTER: ICD-10-CM

## 2020-04-21 ENCOUNTER — TELEPHONE (OUTPATIENT)
Dept: OBSTETRICS AND GYNECOLOGY | Facility: CLINIC | Age: 25
End: 2020-04-21

## 2020-04-21 RX ORDER — HYDROXYZINE HYDROCHLORIDE 25 MG/1
25 TABLET, FILM COATED ORAL 3 TIMES DAILY
Qty: 30 TABLET | Refills: 1 | OUTPATIENT
Start: 2020-04-21 | End: 2020-06-28

## 2020-04-21 NOTE — TELEPHONE ENCOUNTER
----- Message from Sushma Messer MA sent at 4/20/2020  3:40 PM CDT -----      ----- Message -----  From: Hortencia Cameron MA  Sent: 4/20/2020  11:45 AM CDT  To: Dagoberto Garcia Staff    Patient complains she is not able to sleep, she has tried Unisom, Benedryl, and Melatonin. Patient is requesting a prescription to help.

## 2020-04-22 DIAGNOSIS — O21.9 NAUSEA/VOMITING IN PREGNANCY: ICD-10-CM

## 2020-04-22 RX ORDER — METOCLOPRAMIDE 10 MG/1
10 TABLET ORAL EVERY 6 HOURS PRN
Qty: 18 TABLET | Refills: 0 | Status: SHIPPED | OUTPATIENT
Start: 2020-04-22 | End: 2020-04-29 | Stop reason: SDUPTHER

## 2020-04-29 ENCOUNTER — PATIENT MESSAGE (OUTPATIENT)
Dept: OBSTETRICS AND GYNECOLOGY | Facility: CLINIC | Age: 25
End: 2020-04-29

## 2020-04-29 DIAGNOSIS — O21.9 NAUSEA/VOMITING IN PREGNANCY: ICD-10-CM

## 2020-04-29 NOTE — TELEPHONE ENCOUNTER
Patient is requesting a refill of Reglan. Patient stated that the medication does work well and she takes it every day. Would like a 30 day supply.

## 2020-04-30 RX ORDER — METOCLOPRAMIDE 10 MG/1
10 TABLET ORAL EVERY 6 HOURS PRN
Qty: 30 TABLET | Refills: 0 | Status: SHIPPED | OUTPATIENT
Start: 2020-04-30 | End: 2020-05-13 | Stop reason: SDUPTHER

## 2020-05-14 ENCOUNTER — INITIAL PRENATAL (OUTPATIENT)
Dept: OBSTETRICS AND GYNECOLOGY | Facility: CLINIC | Age: 25
End: 2020-05-14
Attending: OBSTETRICS & GYNECOLOGY
Payer: MEDICAID

## 2020-05-14 VITALS
SYSTOLIC BLOOD PRESSURE: 118 MMHG | BODY MASS INDEX: 49.76 KG/M2 | DIASTOLIC BLOOD PRESSURE: 74 MMHG | WEIGHT: 272.06 LBS

## 2020-05-14 DIAGNOSIS — M54.9 BACK PAIN AFFECTING PREGNANCY, ANTEPARTUM: ICD-10-CM

## 2020-05-14 DIAGNOSIS — O30.041 DICHORIONIC DIAMNIOTIC TWIN PREGNANCY IN FIRST TRIMESTER: ICD-10-CM

## 2020-05-14 DIAGNOSIS — O21.9 NAUSEA/VOMITING IN PREGNANCY: ICD-10-CM

## 2020-05-14 DIAGNOSIS — A59.01 TRICHOMONAL VAGINITIS DURING PREGNANCY IN FIRST TRIMESTER: ICD-10-CM

## 2020-05-14 DIAGNOSIS — O23.591 TRICHOMONAL VAGINITIS DURING PREGNANCY IN FIRST TRIMESTER: ICD-10-CM

## 2020-05-14 DIAGNOSIS — A59.01 TRICHOMONAL VULVOVAGINITIS: Primary | ICD-10-CM

## 2020-05-14 DIAGNOSIS — J45.909 ASTHMA DURING PREGNANCY: ICD-10-CM

## 2020-05-14 DIAGNOSIS — O99.891 BACK PAIN AFFECTING PREGNANCY, ANTEPARTUM: ICD-10-CM

## 2020-05-14 DIAGNOSIS — O99.519 ASTHMA DURING PREGNANCY: ICD-10-CM

## 2020-05-14 DIAGNOSIS — O99.519 ASTHMA COMPLICATING PREGNANCY, ANTEPARTUM: ICD-10-CM

## 2020-05-14 DIAGNOSIS — J45.909 ASTHMA COMPLICATING PREGNANCY, ANTEPARTUM: ICD-10-CM

## 2020-05-14 PROCEDURE — 99213 OFFICE O/P EST LOW 20 MIN: CPT | Mod: PBBFAC,TH | Performed by: OBSTETRICS & GYNECOLOGY

## 2020-05-14 PROCEDURE — 99999 PR PBB SHADOW E&M-EST. PATIENT-LVL III: ICD-10-PCS | Mod: PBBFAC,,, | Performed by: OBSTETRICS & GYNECOLOGY

## 2020-05-14 PROCEDURE — 99999 PR PBB SHADOW E&M-EST. PATIENT-LVL III: CPT | Mod: PBBFAC,,, | Performed by: OBSTETRICS & GYNECOLOGY

## 2020-05-14 PROCEDURE — 87086 URINE CULTURE/COLONY COUNT: CPT

## 2020-05-14 PROCEDURE — 99213 PR OFFICE/OUTPT VISIT, EST, LEVL III, 20-29 MIN: ICD-10-PCS | Mod: TH,S$PBB,, | Performed by: OBSTETRICS & GYNECOLOGY

## 2020-05-14 PROCEDURE — 99213 OFFICE O/P EST LOW 20 MIN: CPT | Mod: TH,S$PBB,, | Performed by: OBSTETRICS & GYNECOLOGY

## 2020-05-14 RX ORDER — METRONIDAZOLE 500 MG/1
1000 TABLET ORAL EVERY 12 HOURS
Qty: 8 TABLET | Refills: 0 | Status: SHIPPED | OUTPATIENT
Start: 2020-05-14 | End: 2020-05-15

## 2020-05-14 RX ORDER — MONTELUKAST SODIUM 10 MG/1
10 TABLET ORAL DAILY
Qty: 30 TABLET | Refills: 2 | Status: SHIPPED | OUTPATIENT
Start: 2020-05-14 | End: 2020-06-17

## 2020-05-14 RX ORDER — ALBUTEROL SULFATE 90 UG/1
AEROSOL, METERED RESPIRATORY (INHALATION)
Qty: 18 G | Refills: 0 | Status: SHIPPED | OUTPATIENT
Start: 2020-05-14 | End: 2020-06-17

## 2020-05-14 RX ORDER — PROMETHAZINE HYDROCHLORIDE 25 MG/1
25 TABLET ORAL EVERY 4 HOURS
Qty: 30 TABLET | Refills: 1 | Status: SHIPPED | OUTPATIENT
Start: 2020-05-14 | End: 2020-06-17

## 2020-05-14 RX ORDER — DOXYLAMINE SUCCINATE AND PYRIDOXINE HYDROCHLORIDE, DELAYED RELEASE TABLETS 10 MG/10 MG 10; 10 MG/1; MG/1
2 TABLET, DELAYED RELEASE ORAL NIGHTLY PRN
Qty: 30 TABLET | Refills: 1 | Status: SHIPPED | OUTPATIENT
Start: 2020-05-14 | End: 2020-06-17

## 2020-05-14 NOTE — PROGRESS NOTES
TAKING REGLAN Q 6 HOURS, VOMITING 2X/ DAY, SOMETIMES MORE, NAUSEATED ALL THE TIME.  NO HEARTBURN.  INTERESTED IN   USING ALBUTEROL DAILY- WILL START SINGULAIR  Trich was not treated, given medication for self and partner today.  Will add diclegis first then phenergan PRN.   Some back pain, no pyelo symptoms, but will repeat cultures due to multiple organisms on first culture.  FHT verified by bedside sono.  All precautions discussed.

## 2020-05-15 ENCOUNTER — TELEPHONE (OUTPATIENT)
Dept: OBSTETRICS AND GYNECOLOGY | Facility: CLINIC | Age: 25
End: 2020-05-15

## 2020-05-15 LAB
BACTERIA UR CULT: NORMAL
BACTERIA UR CULT: NORMAL

## 2020-05-19 ENCOUNTER — TELEPHONE (OUTPATIENT)
Dept: REHABILITATION | Facility: HOSPITAL | Age: 25
End: 2020-05-19

## 2020-05-19 NOTE — TELEPHONE ENCOUNTER
Called and left a voicemail for patient to speak with her about her PT referral. She was emailed an HEP to the email account on file.

## 2020-05-20 ENCOUNTER — PATIENT MESSAGE (OUTPATIENT)
Dept: OBSTETRICS AND GYNECOLOGY | Facility: CLINIC | Age: 25
End: 2020-05-20

## 2020-05-24 ENCOUNTER — PATIENT MESSAGE (OUTPATIENT)
Dept: OBSTETRICS AND GYNECOLOGY | Facility: CLINIC | Age: 25
End: 2020-05-24

## 2020-06-11 ENCOUNTER — ROUTINE PRENATAL (OUTPATIENT)
Dept: OBSTETRICS AND GYNECOLOGY | Facility: CLINIC | Age: 25
End: 2020-06-11
Attending: OBSTETRICS & GYNECOLOGY
Payer: MEDICAID

## 2020-06-11 VITALS
BODY MASS INDEX: 53.31 KG/M2 | WEIGHT: 291.44 LBS | SYSTOLIC BLOOD PRESSURE: 132 MMHG | DIASTOLIC BLOOD PRESSURE: 80 MMHG

## 2020-06-11 DIAGNOSIS — Z20.2 TRICHOMONAS CONTACT, TREATED: Primary | ICD-10-CM

## 2020-06-11 PROCEDURE — 99213 OFFICE O/P EST LOW 20 MIN: CPT | Mod: TH,S$PBB,, | Performed by: OBSTETRICS & GYNECOLOGY

## 2020-06-11 PROCEDURE — 99213 OFFICE O/P EST LOW 20 MIN: CPT | Mod: PBBFAC | Performed by: OBSTETRICS & GYNECOLOGY

## 2020-06-11 PROCEDURE — 99999 PR PBB SHADOW E&M-EST. PATIENT-LVL III: ICD-10-PCS | Mod: PBBFAC,,, | Performed by: OBSTETRICS & GYNECOLOGY

## 2020-06-11 PROCEDURE — 99999 PR PBB SHADOW E&M-EST. PATIENT-LVL III: CPT | Mod: PBBFAC,,, | Performed by: OBSTETRICS & GYNECOLOGY

## 2020-06-11 PROCEDURE — 99213 PR OFFICE/OUTPT VISIT, EST, LEVL III, 20-29 MIN: ICD-10-PCS | Mod: TH,S$PBB,, | Performed by: OBSTETRICS & GYNECOLOGY

## 2020-06-11 NOTE — PROGRESS NOTES
Nausea and vomiting has completely resolved, no medications for nausea at this time.   Asthma is better, not taking singulair, not taking albuterol at all.  Getting CHEMO for prior c/s, will sign today.  Repeat culture

## 2020-06-12 ENCOUNTER — TELEPHONE (OUTPATIENT)
Dept: OBSTETRICS AND GYNECOLOGY | Facility: CLINIC | Age: 25
End: 2020-06-12

## 2020-06-12 NOTE — TELEPHONE ENCOUNTER
----- Message from Anitra Kaye sent at 6/12/2020  3:59 PM CDT -----  Contact: BARRY ORANTES [[id number]]   Name of Who is Calling:     What is the request in detail: patient request call back in reference to 4 week appointment Please contact to further discuss and advise      Can the clinic reply by [facility]: no     [facility]  [phone]       Returned patient call, schedule patient for her 4 week follow up. Patient verbalized and understand

## 2020-06-14 ENCOUNTER — TELEPHONE (OUTPATIENT)
Dept: EMERGENCY MEDICINE | Facility: OTHER | Age: 25
End: 2020-06-14

## 2020-06-14 ENCOUNTER — HOSPITAL ENCOUNTER (EMERGENCY)
Facility: OTHER | Age: 25
Discharge: HOME | End: 2020-06-14
Attending: EMERGENCY MEDICINE
Payer: COMMERCIAL

## 2020-06-14 ENCOUNTER — NURSE TRIAGE (OUTPATIENT)
Dept: ADMINISTRATIVE | Facility: CLINIC | Age: 25
End: 2020-06-14

## 2020-06-14 VITALS
SYSTOLIC BLOOD PRESSURE: 140 MMHG | DIASTOLIC BLOOD PRESSURE: 66 MMHG | HEART RATE: 98 BPM | BODY MASS INDEX: 49.87 KG/M2 | OXYGEN SATURATION: 98 % | HEIGHT: 62 IN | TEMPERATURE: 98 F | RESPIRATION RATE: 18 BRPM | WEIGHT: 271 LBS

## 2020-06-14 DIAGNOSIS — R51.9 FRONTAL HEADACHE: Primary | ICD-10-CM

## 2020-06-14 DIAGNOSIS — A59.01 TRICHOMONAS VAGINITIS: ICD-10-CM

## 2020-06-14 LAB
ALBUMIN SERPL BCP-MCNC: 2.8 G/DL (ref 3.5–5.2)
ALP SERPL-CCNC: 62 U/L (ref 55–135)
ALT SERPL W/O P-5'-P-CCNC: 14 U/L (ref 10–44)
ANION GAP SERPL CALC-SCNC: 13 MMOL/L (ref 8–16)
AST SERPL-CCNC: 17 U/L (ref 10–40)
BACTERIA #/AREA URNS HPF: ABNORMAL /HPF
BASOPHILS # BLD AUTO: 0.01 K/UL (ref 0–0.2)
BASOPHILS NFR BLD: 0.1 % (ref 0–1.9)
BILIRUB SERPL-MCNC: 0.3 MG/DL (ref 0.1–1)
BILIRUB UR QL STRIP: NEGATIVE
BUN SERPL-MCNC: 6 MG/DL (ref 6–20)
CALCIUM SERPL-MCNC: 8.9 MG/DL (ref 8.7–10.5)
CHLORIDE SERPL-SCNC: 108 MMOL/L (ref 95–110)
CLARITY UR: CLEAR
CO2 SERPL-SCNC: 18 MMOL/L (ref 23–29)
COLOR UR: YELLOW
CREAT SERPL-MCNC: 0.6 MG/DL (ref 0.5–1.4)
DIFFERENTIAL METHOD: ABNORMAL
EOSINOPHIL # BLD AUTO: 0.2 K/UL (ref 0–0.5)
EOSINOPHIL NFR BLD: 2.1 % (ref 0–8)
ERYTHROCYTE [DISTWIDTH] IN BLOOD BY AUTOMATED COUNT: 13.2 % (ref 11.5–14.5)
EST. GFR  (AFRICAN AMERICAN): >60 ML/MIN/1.73 M^2
EST. GFR  (NON AFRICAN AMERICAN): >60 ML/MIN/1.73 M^2
GLUCOSE SERPL-MCNC: 107 MG/DL (ref 70–110)
GLUCOSE UR QL STRIP: NEGATIVE
HCT VFR BLD AUTO: 35.3 % (ref 37–48.5)
HGB BLD-MCNC: 12 G/DL (ref 12–16)
HGB UR QL STRIP: ABNORMAL
IMM GRANULOCYTES # BLD AUTO: 0.03 K/UL (ref 0–0.04)
IMM GRANULOCYTES NFR BLD AUTO: 0.4 % (ref 0–0.5)
KETONES UR QL STRIP: NEGATIVE
LEUKOCYTE ESTERASE UR QL STRIP: ABNORMAL
LYMPHOCYTES # BLD AUTO: 1.2 K/UL (ref 1–4.8)
LYMPHOCYTES NFR BLD: 16.6 % (ref 18–48)
MCH RBC QN AUTO: 29.9 PG (ref 27–31)
MCHC RBC AUTO-ENTMCNC: 34 G/DL (ref 32–36)
MCV RBC AUTO: 88 FL (ref 82–98)
MICROSCOPIC COMMENT: ABNORMAL
MONOCYTES # BLD AUTO: 0.5 K/UL (ref 0.3–1)
MONOCYTES NFR BLD: 7.2 % (ref 4–15)
NEUTROPHILS # BLD AUTO: 5.2 K/UL (ref 1.8–7.7)
NEUTROPHILS NFR BLD: 73.6 % (ref 38–73)
NITRITE UR QL STRIP: NEGATIVE
NRBC BLD-RTO: 0 /100 WBC
PH UR STRIP: 7 [PH] (ref 5–8)
PLATELET # BLD AUTO: 269 K/UL (ref 150–350)
PMV BLD AUTO: 9 FL (ref 9.2–12.9)
POTASSIUM SERPL-SCNC: 3.9 MMOL/L (ref 3.5–5.1)
PROT SERPL-MCNC: 7.1 G/DL (ref 6–8.4)
PROT UR QL STRIP: NEGATIVE
RBC # BLD AUTO: 4.02 M/UL (ref 4–5.4)
RBC #/AREA URNS HPF: 3 /HPF (ref 0–4)
SODIUM SERPL-SCNC: 139 MMOL/L (ref 136–145)
SP GR UR STRIP: 1.01 (ref 1–1.03)
SQUAMOUS #/AREA URNS HPF: 23 /HPF
TRICHOMONAS UR QL MICRO: ABNORMAL
URN SPEC COLLECT METH UR: ABNORMAL
UROBILINOGEN UR STRIP-ACNC: NEGATIVE EU/DL
WBC # BLD AUTO: 7.09 K/UL (ref 3.9–12.7)
WBC #/AREA URNS HPF: 6 /HPF (ref 0–5)

## 2020-06-14 PROCEDURE — 80053 COMPREHEN METABOLIC PANEL: CPT

## 2020-06-14 PROCEDURE — 81000 URINALYSIS NONAUTO W/SCOPE: CPT

## 2020-06-14 PROCEDURE — 96374 THER/PROPH/DIAG INJ IV PUSH: CPT

## 2020-06-14 PROCEDURE — 63600175 PHARM REV CODE 636 W HCPCS: Performed by: PHYSICIAN ASSISTANT

## 2020-06-14 PROCEDURE — 25000003 PHARM REV CODE 250: Performed by: PHYSICIAN ASSISTANT

## 2020-06-14 PROCEDURE — 96375 TX/PRO/DX INJ NEW DRUG ADDON: CPT

## 2020-06-14 PROCEDURE — 85025 COMPLETE CBC W/AUTO DIFF WBC: CPT

## 2020-06-14 PROCEDURE — 99284 EMERGENCY DEPT VISIT MOD MDM: CPT | Mod: 25

## 2020-06-14 RX ORDER — METRONIDAZOLE 500 MG/1
2000 TABLET ORAL ONCE
Qty: 8 TABLET | Refills: 0 | Status: SHIPPED | OUTPATIENT
Start: 2020-06-14 | End: 2020-06-14

## 2020-06-14 RX ORDER — DIPHENHYDRAMINE HYDROCHLORIDE 50 MG/ML
25 INJECTION INTRAMUSCULAR; INTRAVENOUS
Status: COMPLETED | OUTPATIENT
Start: 2020-06-14 | End: 2020-06-14

## 2020-06-14 RX ORDER — METOCLOPRAMIDE HYDROCHLORIDE 5 MG/ML
5 INJECTION INTRAMUSCULAR; INTRAVENOUS
Status: COMPLETED | OUTPATIENT
Start: 2020-06-14 | End: 2020-06-14

## 2020-06-14 RX ORDER — METRONIDAZOLE 500 MG/1
2000 TABLET ORAL ONCE
Qty: 8 TABLET | Refills: 0 | Status: SHIPPED | OUTPATIENT
Start: 2020-06-14 | End: 2020-06-14 | Stop reason: ALTCHOICE

## 2020-06-14 RX ORDER — BUTALBITAL, ACETAMINOPHEN AND CAFFEINE 50; 325; 40 MG/1; MG/1; MG/1
1 TABLET ORAL
Status: COMPLETED | OUTPATIENT
Start: 2020-06-14 | End: 2020-06-14

## 2020-06-14 RX ORDER — BUTALBITAL, ACETAMINOPHEN AND CAFFEINE 50; 325; 40 MG/1; MG/1; MG/1
1 TABLET ORAL EVERY 4 HOURS PRN
Qty: 15 TABLET | Refills: 0 | Status: SHIPPED | OUTPATIENT
Start: 2020-06-14 | End: 2020-07-01 | Stop reason: SDUPTHER

## 2020-06-14 RX ADMIN — DIPHENHYDRAMINE HYDROCHLORIDE 25 MG: 50 INJECTION, SOLUTION INTRAMUSCULAR; INTRAVENOUS at 11:06

## 2020-06-14 RX ADMIN — SODIUM CHLORIDE 1000 ML: 0.9 INJECTION, SOLUTION INTRAVENOUS at 11:06

## 2020-06-14 RX ADMIN — BUTALBITAL, ACETAMINOPHEN, AND CAFFEINE 1 TABLET: 50; 325; 40 TABLET ORAL at 11:06

## 2020-06-14 RX ADMIN — METOCLOPRAMIDE 5 MG: 5 INJECTION, SOLUTION INTRAMUSCULAR; INTRAVENOUS at 11:06

## 2020-06-14 NOTE — TELEPHONE ENCOUNTER
"Calling extreme headache, states this has been on going, taking OTC magnesium, has helped in the past, but now it is very painful, pain score of 8, ED disposition given, patient is in car driving, cautioned re driving, heading to [facility] ED, no other questions or concerns at this time.     Reason for Disposition   [1] SEVERE headache (e.g., excruciating) AND [2] "worst headache" of life    Additional Information   Negative: Difficult to awaken or acting confused (e.g., disoriented, slurred speech)   Negative: [1] Weakness of the face, arm or leg on one side of the body AND [2] new onset   Negative: [1] Numbness of the face, arm or leg on one side of the body AND [2] new onset   Negative: [1] Loss of speech or garbled speech AND [2] new onset   Negative: Sounds like a life-threatening emergency to the triager   Negative: Followed a head injury within last 3 days   Negative: Traumatic Brain Injury (TBI) is suspected   Negative: Sinus pain of forehead and yellow or green nasal discharge   Negative: Unable to walk, or can only walk with assistance (e.g., requires support)   Negative: Stiff neck (can't touch chin to chest)   Negative: Severe pain in one eye   Negative: [1] Other family members (or roommates) with headaches AND [2] possibility of carbon monoxide exposure    Protocols used: PREGNANCY - HEADACHE-A-AH      "

## 2020-06-14 NOTE — Clinical Note
Liliya Moore was seen and treated in our emergency department on 6/14/2020.  She may return to work on 06/15/2020.       If you have any questions or concerns, please don't hesitate to call.      Phil Vazquez MD

## 2020-06-14 NOTE — ED PROVIDER NOTES
Encounter Date: 6/14/2020       History     Chief Complaint   Patient presents with    Headache     pt with c/o headache for two weeks . pt states increased headaches on rising in am ,. pt states tylenol helps  abit.      Afebrile 25-year-old female with current pregnancy at 16 weeks gestation presents the ED for evaluation of headaches.  Patient reports that she has had headaches for approximately 1 month.  She states that they typically occur daily.  She states they seem to occur in the morning after standing up to start her day she does report some associated photophobia however denies any other symptoms.  She states the pain is a constant ache to the forehead region.  She states that she has been taking Tylenol with some improvement however that the pain does return.  She has spoken to her OB and reference to these headaches and recognize initially her blood pressure was mildly elevated however after beginning magnesium oxide her blood pressure has improved markedly however she continues with the headaches.  She denies any blurred vision, vision loss, chest pain, palpitations, shortness of breath, nausea, vomiting, diarrhea, numbness, tingling or unilateral weakness.  She does report occasional lightheadedness with standing however denies any presently.    The history is provided by the patient and medical records.     Review of patient's allergies indicates:  No Known Allergies  Past Medical History:   Diagnosis Date    Anemia     Asthma complicating pregnancy, antepartum 2/9/2015     No past surgical history on file.  Family History   Problem Relation Age of Onset    Breast cancer Neg Hx     Colon cancer Neg Hx     Ovarian cancer Neg Hx      Social History     Tobacco Use    Smoking status: Never Smoker    Smokeless tobacco: Never Used   Substance Use Topics    Alcohol use: No    Drug use: No     Review of Systems   Constitutional: Negative for chills and fever.   HENT: Negative for sore throat.     Eyes: Positive for photophobia. Negative for pain, discharge, redness, itching and visual disturbance.   Respiratory: Negative for shortness of breath.    Cardiovascular: Negative for chest pain.   Gastrointestinal: Negative for abdominal pain, nausea and vomiting.   Genitourinary: Negative for dysuria, flank pain, vaginal bleeding and vaginal discharge.   Musculoskeletal: Negative for arthralgias, back pain, neck pain and neck stiffness.   Skin: Negative for rash.   Neurological: Positive for headaches. Negative for dizziness, weakness and numbness.   Hematological: Does not bruise/bleed easily.   Psychiatric/Behavioral: Negative for confusion.       Physical Exam     Initial Vitals   BP Pulse Resp Temp SpO2   06/14/20 1016 06/14/20 1016 06/14/20 1016 06/14/20 1018 06/14/20 1016   117/74 101 18 98.5 °F (36.9 °C) 95 %      MAP       --                Physical Exam    Nursing note and vitals reviewed.  Constitutional: Vital signs are normal. She appears well-developed and well-nourished. She is cooperative.  Non-toxic appearance. She does not appear ill. No distress.   HENT:   Head: Normocephalic and atraumatic.   Eyes: Conjunctivae, EOM and lids are normal. Pupils are equal, round, and reactive to light.   Mild photophobia   Neck: Normal range of motion. Neck supple. No neck rigidity.   No meningismus   Cardiovascular: Normal rate and regular rhythm.   Pulmonary/Chest: Breath sounds normal. No respiratory distress. She has no wheezes. She has no rhonchi.   Abdominal: Soft. Normal appearance and bowel sounds are normal. There is no abdominal tenderness. There is no rigidity and no guarding.   Musculoskeletal: Normal range of motion.   Neurological: She is alert and oriented to person, place, and time. GCS eye subscore is 4. GCS verbal subscore is 5. GCS motor subscore is 6.   Skin: Skin is warm, dry and intact. No rash noted. No pallor.   Psychiatric: She has a normal mood and affect. Her speech is normal and  behavior is normal. Thought content normal.         ED Course   Procedures  Labs Reviewed   CBC W/ AUTO DIFFERENTIAL - Abnormal; Notable for the following components:       Result Value    Hematocrit 35.3 (*)     MPV 9.0 (*)     Gran% 73.6 (*)     Lymph% 16.6 (*)     All other components within normal limits   COMPREHENSIVE METABOLIC PANEL - Abnormal; Notable for the following components:    CO2 18 (*)     Albumin 2.8 (*)     All other components within normal limits   URINALYSIS, REFLEX TO URINE CULTURE - Abnormal; Notable for the following components:    Occult Blood UA Trace (*)     Leukocytes, UA 1+ (*)     All other components within normal limits    Narrative:     Preferred Collection Type->Urine, Clean Catch  Specimen Source->Urine   URINALYSIS MICROSCOPIC - Abnormal; Notable for the following components:    WBC, UA 6 (*)     Trichomonas, UA Occasional (*)     All other components within normal limits    Narrative:     Preferred Collection Type->Urine, Clean Catch  Specimen Source->Urine          Imaging Results    None          Medical Decision Making:   Differential Diagnosis:   Differential Diagnosis includes, but is not limited to:  Ischemic stroke, hemorrhagic stroke, subarachnoid hemorrhage/ruptured aneurysm, intracranial lesion/mass, meningitis/encephalitis, epidural hematoma, subdural hematoma, pseudotumor cerebri, venous sinus thrombosis, CO poisoning, hypertensive encephalopathy, MI/ACS, head trauma/contusion, concussion, sinus headache, dehydration, anxiety, medication non-compliance, primary headache (tension/cluster/migraine).    Clinical Tests:   Lab Tests: Ordered and Reviewed                   ED Course as of Saurabh 15 1523   Sun Jun 14, 2020   1348 Physician Attestation Statement: I have reviewed this case with my non-physician provider.  Physician Attestation Statement: I have provided a face to face evaluation of this patient at the request of my  non-physician provider.  Physician Attestation  Statement: The patient's condition warranted physician involvement. The treatment regimen was reviewed by me.   Management decisions for this encounter made amidst early acute phase of COVID19 public health emergency; emergency medicine workup standards and admission vs. discharge standards have necessarily shifted.     Patient is a 25-year-old female at approximately 16 weeks twin gestation who presents with recurring headaches for last 2 weeks.  No focal deficits.  No trauma.  Patient is a normal neurologic exam.  I performed a bedside ultrasound which revealed approximately 16 week twin gestation, with of fetus is having normal heart rates for gestational age and vigorous movement.  Patient reported her headache essentially resolved after Fioricet, metoclopramide, Benadryl.  OBGYN was consulted.  They agree patient is in a meet admission criteria at this time, and that patient would likely benefit from outpatient neurology f/u for her headaches.     [RC]      ED Course User Index  [RC] Phil Vazquez MD                Clinical Impression:       ICD-10-CM ICD-9-CM   1. Frontal headache  R51 784.0   2. Trichomonas vaginitis  A59.01 131.01             ED Disposition Condition    Discharge Stable        ED Prescriptions     Medication Sig Dispense Start Date End Date Auth. Provider    butalbital-acetaminophen-caffeine -40 mg (FIORICET, ESGIC) -40 mg per tablet Take 1 tablet by mouth every 4 (four) hours as needed for Pain. 15 tablet 6/14/2020 7/14/2020 JOSI Streeter        Follow-up Information     Follow up With Specialties Details Why Contact Info Additional Information    Radha Arenas DO Obstetrics and Gynecology Schedule an appointment as soon as possible for a visit   [address] [facility] [address]  [facility] [address]  [phone]       [facility] [address] Go to   [facility] [facility] [id number] [phone] Neurology [facility]  [address], [address]th Floor, [address] Please park in [address] [place] [address] and use [place] elevators                                     Phil Vazquez MD  06/15/20 1397

## 2020-06-14 NOTE — ED TRIAGE NOTES
Pt reports headache x 2 weeks. Pt reports currently 16 wk pregnant with twins. Pt reports initially had n/v several weeks ago but hasn't vomiting in last 2 weeks. Reports pt goes to bed with headache and wakes up the headache is gone. Pt reports headache worsens throughout day. Reports borderline HTN prior to pregnancy but denies requiring medical management.

## 2020-06-15 ENCOUNTER — TELEPHONE (OUTPATIENT)
Dept: OBSTETRICS AND GYNECOLOGY | Facility: CLINIC | Age: 25
End: 2020-06-15

## 2020-06-15 NOTE — TELEPHONE ENCOUNTER
----- Message from Concepción Renae sent at 6/15/2020 12:51 PM CDT -----    Name of Who is Calling:BARRY ORANTES [[id number]]      What is the request in detail: Pt would like a call back regarding medication reaction metroNIDAZOLE (FLAGYL) 500 MG tablet  . Pt states she can not keep medication down. Pt states this is an urgent message Please contact to further discuss and advise     Can the clinic reply by [facility]: Yes    [facility] [phone]            Returned patient call, patient states she took one pill today and vomit. Patient states this is the second time this has happened with the medication. Inform patient Dr Arenas is out of the office today and will send the message to one of her partners. Patient verbalized and understand

## 2020-06-16 ENCOUNTER — TELEPHONE (OUTPATIENT)
Dept: OBSTETRICS AND GYNECOLOGY | Facility: CLINIC | Age: 25
End: 2020-06-16

## 2020-06-16 NOTE — TELEPHONE ENCOUNTER
----- Message from Lidia Hall sent at 6/16/2020  3:03 PM CDT -----  Patient called in to speak w/ staff In regards to medication. Patient contact number [phone].     Returned patient call, patient states she still vomiting after taking flagyl. Patient states she took nausea medication first and then ate, waited 20 minutes before taking flagyl and still vomit. Patient would like to know what should she do. Inform patient Dr Arenas is in the room with a patient and I will speak with her and give patient a call back today. Patient verbalized and understand

## 2020-06-16 NOTE — TELEPHONE ENCOUNTER
----- Message from Lidia Hall sent at 6/16/2020  3:03 PM CDT -----  Patient called in to speak w/ staff In regards to medication. Patient contact number [phone].

## 2020-06-17 ENCOUNTER — TELEPHONE (OUTPATIENT)
Dept: OBSTETRICS AND GYNECOLOGY | Facility: CLINIC | Age: 25
End: 2020-06-17

## 2020-06-17 DIAGNOSIS — T37.8X5A: ICD-10-CM

## 2020-06-17 DIAGNOSIS — A59.01 TRICHOMONAL VULVOVAGINITIS: Primary | ICD-10-CM

## 2020-06-17 RX ORDER — TINIDAZOLE 500 MG/1
2 TABLET ORAL ONCE
Qty: 4 TABLET | Refills: 0 | Status: SHIPPED | OUTPATIENT
Start: 2020-06-17 | End: 2020-06-17

## 2020-06-17 NOTE — TELEPHONE ENCOUNTER
Counseled patient that there isn't another type of antibiotic for trich.  Will try tindazole and see if she tolerates it better.

## 2020-06-17 NOTE — TELEPHONE ENCOUNTER
----- Message from Enmanuel Post MA sent at 6/17/2020  4:27 PM CDT -----  Please advise  ----- Message -----  From: Vargas Morillo  Sent: 6/17/2020   3:11 PM CDT  To: Dagoberto Garcia Staff    Please call pt [phone]

## 2020-06-17 NOTE — TELEPHONE ENCOUNTER
----- Message from Vargas Morillo sent at 6/17/2020 11:57 AM CDT -----  PT RETURNING YOUR CALL [phone]        Spoke with patient, patient states she was told from the ED that she still has trich. Patient states she can not take flagyl and would like something else to take. Inform patient I will speak with Dr Arenas and someone will call her.

## 2020-06-19 ENCOUNTER — TELEPHONE (OUTPATIENT)
Dept: OBSTETRICS AND GYNECOLOGY | Facility: CLINIC | Age: 25
End: 2020-06-19

## 2020-06-19 NOTE — TELEPHONE ENCOUNTER
----- Message from Anitra Kaye sent at 6/19/2020  8:50 AM CDT -----   Name of Who is Calling:     What is the request in detail: patient request call back     NFI   Please contact to further discuss and advise      Can the clinic reply by [facility]: no     [facility]  [phone]           Returned patient call, patient states she was able to take the flagyl medication as prescribe. Patient requested one flagyl for spouse. Inform patient per Dr Arenas we can call the medication in.

## 2020-06-28 ENCOUNTER — HOSPITAL ENCOUNTER (EMERGENCY)
Facility: OTHER | Age: 25
Discharge: HOME | End: 2020-06-28
Attending: EMERGENCY MEDICINE
Payer: MEDICAID

## 2020-06-28 VITALS
HEIGHT: 62 IN | SYSTOLIC BLOOD PRESSURE: 128 MMHG | WEIGHT: 271 LBS | HEART RATE: 91 BPM | TEMPERATURE: 99 F | DIASTOLIC BLOOD PRESSURE: 79 MMHG | BODY MASS INDEX: 49.87 KG/M2 | RESPIRATION RATE: 16 BRPM | OXYGEN SATURATION: 97 %

## 2020-06-28 DIAGNOSIS — O26.899 PAIN OF ROUND LIGAMENT DURING PREGNANCY: ICD-10-CM

## 2020-06-28 DIAGNOSIS — R51.9 FRONTAL HEADACHE: Primary | ICD-10-CM

## 2020-06-28 DIAGNOSIS — R10.2 PAIN OF ROUND LIGAMENT DURING PREGNANCY: ICD-10-CM

## 2020-06-28 DIAGNOSIS — H65.93 MEE (MIDDLE EAR EFFUSION), BILATERAL: ICD-10-CM

## 2020-06-28 LAB
BILIRUB UR QL STRIP: NEGATIVE
CLARITY UR: CLEAR
COLOR UR: YELLOW
GLUCOSE UR QL STRIP: NEGATIVE
HGB UR QL STRIP: NEGATIVE
KETONES UR QL STRIP: NEGATIVE
LEUKOCYTE ESTERASE UR QL STRIP: NEGATIVE
NITRITE UR QL STRIP: NEGATIVE
PH UR STRIP: 7 [PH] (ref 5–8)
PROT UR QL STRIP: NEGATIVE
SP GR UR STRIP: 1.01 (ref 1–1.03)
URN SPEC COLLECT METH UR: NORMAL
UROBILINOGEN UR STRIP-ACNC: NEGATIVE EU/DL

## 2020-06-28 PROCEDURE — 81003 URINALYSIS AUTO W/O SCOPE: CPT

## 2020-06-28 PROCEDURE — 25000003 PHARM REV CODE 250: Performed by: EMERGENCY MEDICINE

## 2020-06-28 PROCEDURE — 99284 EMERGENCY DEPT VISIT MOD MDM: CPT | Mod: 25

## 2020-06-28 RX ORDER — CETIRIZINE HYDROCHLORIDE 10 MG/1
10 TABLET ORAL DAILY
Qty: 10 TABLET | Refills: 0 | Status: SHIPPED | OUTPATIENT
Start: 2020-06-28 | End: 2020-07-13

## 2020-06-28 RX ORDER — ACETAMINOPHEN 325 MG/1
650 TABLET ORAL
Status: COMPLETED | OUTPATIENT
Start: 2020-06-28 | End: 2020-06-28

## 2020-06-28 RX ORDER — PROMETHAZINE HYDROCHLORIDE 25 MG/1
25 TABLET ORAL
Status: COMPLETED | OUTPATIENT
Start: 2020-06-28 | End: 2020-06-28

## 2020-06-28 RX ORDER — FLUTICASONE PROPIONATE 50 MCG
1 SPRAY, SUSPENSION (ML) NASAL 2 TIMES DAILY PRN
Qty: 15 G | Refills: 0 | Status: SHIPPED | OUTPATIENT
Start: 2020-06-28 | End: 2020-07-13

## 2020-06-28 RX ADMIN — ACETAMINOPHEN 650 MG: 325 TABLET ORAL at 06:06

## 2020-06-28 RX ADMIN — PROMETHAZINE HYDROCHLORIDE 25 MG: 25 TABLET ORAL at 06:06

## 2020-06-28 NOTE — ED PROVIDER NOTES
"Encounter Date: 2020    SCRIBE #1 NOTE: I, Aliya Duque, am scribing for, and in the presence of, Dr. Blanc.       History     Chief Complaint   Patient presents with    Abdominal Pain     sharp pain R of the naval. Pt is feeling the babies move. Pt also is having severe headaches unrelieved from current Rx.      Time seen by provider: 6:12 PM    This is a 25 y.o. female at 18 weeks gestation who presents with complaint of frontal headache that began two days ago, and has been intermittent for several months. She describes the pain as "throbbing" with associated photophobia. She denies any neck pain, fever, chills, numbness, tingling, or changes in vision. Patient reports onset of frontal headaches since the beginning of her pregnancy but symptoms have been managed with tylenol and Fioricet. She states that the Fioricet has not been relieving her headaches for the past two days. She notes that she has an appointment with a neurologist for evaluation of her headaches scheduled in approximately 2 weeks. Patient additionally reports intermittent "stabbing" pain located just to the right of her periumbilical region. She states that this pain began about 2 months ago but worsened today and last seconds to minutes.  This pain is not present currently.  She denies any cough, chest pain, leg swelling, nausea, vomiting, or diarrhea. Patient reports history of asthma. She denies tobacco or drug use or alcohol consumption. She has NKDA. A1.    The history is provided by the patient.     Review of patient's allergies indicates:  No Known Allergies  Past Medical History:   Diagnosis Date    Anemia     Asthma complicating pregnancy, antepartum 2015     History reviewed. No pertinent surgical history.  Family History   Problem Relation Age of Onset    Breast cancer Neg Hx     Colon cancer Neg Hx     Ovarian cancer Neg Hx      Social History     Tobacco Use    Smoking status: Never Smoker    Smokeless " tobacco: Never Used   Substance Use Topics    Alcohol use: No    Drug use: No     Review of Systems   Constitutional: Negative for chills, diaphoresis and fever.   HENT: Negative for congestion, drooling, rhinorrhea, sore throat and trouble swallowing.    Eyes: Negative for pain and discharge.   Respiratory: Negative for cough, chest tightness and shortness of breath.    Cardiovascular: Negative for chest pain.   Gastrointestinal: Negative for abdominal pain, diarrhea, nausea and vomiting.   Genitourinary: Negative for dysuria and frequency.   Musculoskeletal: Negative for back pain and myalgias.   Skin: Negative for pallor, rash and wound.   Neurological: Negative for dizziness, syncope, weakness, light-headedness and numbness.   Psychiatric/Behavioral: Negative for behavioral problems and confusion.   All other systems reviewed and are negative.      Physical Exam     Initial Vitals [06/28/20 1808]   BP Pulse Resp Temp SpO2   128/79 91 16 98.6 °F (37 °C) 97 %      MAP       --         Physical Exam    Nursing note and vitals reviewed.  Constitutional: She appears well-developed and well-nourished. She is cooperative.  Non-toxic appearance. No distress.   HENT:   Head: Normocephalic and atraumatic.   Right Ear: A middle ear effusion is present.   Left Ear: A middle ear effusion is present.   Mouth/Throat: Oropharynx is clear and moist. No posterior oropharyngeal edema or posterior oropharyngeal erythema.   Turbinate swelling.   Eyes: Conjunctivae and EOM are normal. Pupils are equal, round, and reactive to light.   Neck: Normal range of motion and full passive range of motion without pain. Neck supple. No thyromegaly present.   Cardiovascular: Normal rate, regular rhythm, normal heart sounds and normal pulses.   Pulmonary/Chest: Effort normal and breath sounds normal. No respiratory distress.   Abdominal: Soft. Normal appearance and bowel sounds are normal. There is no abdominal tenderness. There is no rebound  and no guarding.   Musculoskeletal: Normal range of motion.   Neurological: She is alert and oriented to person, place, and time. She has normal strength. No cranial nerve deficit or sensory deficit.   Skin: Skin is warm, dry and intact. No rash noted.   Psychiatric: She has a normal mood and affect. Her speech is normal and behavior is normal. Judgment and thought content normal.         ED Course   Procedures  Labs Reviewed   URINALYSIS, REFLEX TO URINE CULTURE    Narrative:     Preferred Collection Type->Urine, Clean Catch  Specimen Source->Urine          Imaging Results    None          Medical Decision Making:   History:   Old Medical Records: I decided to obtain old medical records.  Clinical Tests:   Lab Tests: Ordered and Reviewed  ED Management:  Emergent evaluation of to 25-year-old female who is 18 weeks pregnant with twin gestation, complaint of chronic headaches and chronic abdominal pain.  Vital signs are benign, afebrile.  Physical exam reveals swollen turbinates and bilateral clear middle ear effusions without erythema or evidence of infection.  I suspect sinus headache may be causing her current symptoms.  I will prescribe a 10 day supply of Zyrtec and encourage use of Flonase for 10 days to assist with drainage.  She also has neurology appointment scheduled in 2 weeks, but no neurologic symptoms on my exam and I doubt meningitis or encephalitis or subarachnoid hemorrhage or emergent cause of chronic headache.  Secondary complaint is chronic intermittent right lower abdominal pain.  Given duration of symptoms I doubt appendicitis and suspect round ligament pain.  Fetal heart tones are benign, and urinalysis shows no UTI or Trichomonas.  Ultimately she is discharged in good condition and encouraged close follow-up or return for new or worsening symptoms.            Scribe Attestation:   Scribe #1: I performed the above scribed service and the documentation accurately describes the services I  performed. I attest to the accuracy of the note.    Attending Attestation:           Physician Attestation for Scribe:  Physician Attestation Statement for Scribe #1: I, Dr. Blanc, reviewed documentation, as scribed by Aliya Duque in my presence, and it is both accurate and complete.                               Clinical Impression:     1. Frontal headache    2. Pain of round ligament during pregnancy    3. VERNON (middle ear effusion), bilateral                ED Disposition Condition    Discharge Stable        ED Prescriptions     Medication Sig Dispense Start Date End Date Auth. Provider    fluticasone propionate (FLONASE) 50 mcg/actuation nasal spray 1 spray (50 mcg total) by Each Nostril route 2 (two) times daily as needed. 15 g 6/28/2020  Niki Blanc MD    cetirizine (ZYRTEC) 10 MG tablet Take 1 tablet (10 mg total) by mouth once daily. 10 tablet 6/28/2020 6/28/2021 Niki Blanc MD        Follow-up Information     Follow up With Specialties Details Why Contact Info    Radha Arenas DO Obstetrics and Gynecology Schedule an appointment as soon as possible for a visit   [address] [address]  [phone]      [facility]  As needed, If symptoms worsen [address] [facility] [id number] [phone]                                     Niki Blanc MD  06/28/20 2028

## 2020-06-28 NOTE — ED TRIAGE NOTES
Patient comes to the Er with headaches. Patient was recently prescribed fiorcets for migraine headaches. No relief the past couple days. Patient states she is 18 weeks pregnant and having right lower abdominal pain. Told to drink fluids and take tylenol but no relief. Dr. Cherry is obgyn. 3rd pregnancy, 1 miscarriage, 1 living.

## 2020-06-28 NOTE — Clinical Note
"Liliya "Jessica Moore was seen and treated in our emergency department on 6/28/2020.     COVID-19 is present in our communities across the state. There is limited testing for COVID at this time, so not all patients can be tested. In this situation, your employee meets the following criteria:    Liliya Moore has met the criteria for COVID-19 testing and has a POSITIVE result. She can return to work once they are asymptomatic for 72 hours without the use of fever reducing medications AND at least ten days from the first positive result.     If you have any questions or concerns, or if I can be of further assistance, please do not hesitate to contact me.    Sincerely,             Niki Blanc MD"

## 2020-07-01 RX ORDER — BUTALBITAL, ACETAMINOPHEN AND CAFFEINE 50; 325; 40 MG/1; MG/1; MG/1
1 TABLET ORAL EVERY 4 HOURS PRN
Qty: 15 TABLET | Refills: 0 | Status: SHIPPED | OUTPATIENT
Start: 2020-07-01 | End: 2020-07-31

## 2020-07-06 ENCOUNTER — CLINICAL SUPPORT (OUTPATIENT)
Dept: REHABILITATION | Facility: OTHER | Age: 25
End: 2020-07-06
Attending: OBSTETRICS & GYNECOLOGY
Payer: MEDICAID

## 2020-07-06 DIAGNOSIS — O99.891 BACK PAIN AFFECTING PREGNANCY, ANTEPARTUM: ICD-10-CM

## 2020-07-06 DIAGNOSIS — M54.50 ACUTE BILATERAL LOW BACK PAIN WITHOUT SCIATICA: ICD-10-CM

## 2020-07-06 DIAGNOSIS — M54.9 BACK PAIN AFFECTING PREGNANCY, ANTEPARTUM: ICD-10-CM

## 2020-07-06 DIAGNOSIS — Z74.09 DECREASED FUNCTIONAL MOBILITY AND ENDURANCE: ICD-10-CM

## 2020-07-06 PROCEDURE — 97162 PT EVAL MOD COMPLEX 30 MIN: CPT | Mod: PN | Performed by: PHYSICAL THERAPIST

## 2020-07-06 PROCEDURE — 97110 THERAPEUTIC EXERCISES: CPT | Mod: PN | Performed by: PHYSICAL THERAPIST

## 2020-07-06 NOTE — PLAN OF CARE
[facility]  Physical Therapy Initial Evaluation    Date: 2020   Name: Liliya Moore  Clinic Number: [id number]    Therapy Diagnosis:   Encounter Diagnoses   Name Primary?    Back pain affecting pregnancy, antepartum     Acute bilateral low back pain without sciatica     Decreased functional mobility and endurance      Physician: Radha Arenas DO    Physician Orders: PT Eval and Treat   Medical Diagnosis from Referral: Back pain affecting pregnancy, antepartum  Evaluation Date: 2020  Authorization Period Expiration: 2020  Plan of Care Expiration: 2020  Visit # / Visits authorized:     Time In: 3:00pm  Time Out: 3:35pm  Total Appointment Time (timed & untimed codes): 35 minutes    Precautions: Standard and pregnancy     Subjective   Date of onset: 3 months   History of current condition - Liliya reports: Pt reports onset of back pain 3 months ago. She is 20 weeks pregnant with twins. This is her second pregnancy, first one  birth. She is having upper back and lower back pain. Also has B groin/ pelvic pain, round ligament pain, and headaches. She has a maternity belt that helps with the pelvic pain. She denies any bowel/ bladder changes, saddle anesthesia, bleeding, complications. She has a history of asthma and is getting short of breath with everything. She is given more opportunities to sit at work now and has help at home from her  and 5 year old son.      Medical History:   Past Medical History:   Diagnosis Date    Anemia     Asthma complicating pregnancy, antepartum 2015       Surgical History:   Liliya Moore  has no past surgical history on file.    Medications:   Liliya has a current medication list which includes the following prescription(s): butalbital-acetaminophen-caffeine -40 mg, cetirizine, fluticasone propionate, fluticasone-salmeterol 100-50 mcg/dose, and prenatal 21-iron fu-folic acid.    Allergies:   Review  "of patient's allergies indicates:  No Known Allergies     Imaging, none:     Prior Therapy: no  Social History:  lives with their family  Occupation: works at [employer]   Prior Level of Function: independent with all ADL's  Current Level of Function: decreased endurance, "waddling" when she walks, difficulty getting in<>out of bed, difficulty with bending , prolonged standing, walking     Pain:  Current 4/10, worst 8/10, best 0/10   Location: midline thoracic and lumbar spine  Description: Aching  Aggravating Factors: Standing, Walking and bending  Easing Factors: rest    Patients goals: to be comfortable     Objective     Observation: calm and pleasant mood    Posture:  Increased lumbar lordosis ; level PSIS and iliac crest    Lumbar Range of Motion:    percentage Pain   Flexion 50%   No reversal of spinal curve, thoracic spine pain        Extension 75%   -        Left Side Bending 100% -        Right Side Bending 100% -        Left rotation   75% -        Right Rotation   75% -             Lower Extremity Strength  Right LE  Left LE    Knee extension: 5/5 Knee extension: 5/5   Knee flexion: 5/5 Knee flexion: 5/5   Hip flexion: 4/5 Hip flexion: 4/5   Hip abduction: 4/5 Hip abduction: 4/5   Hip adduction: 5/5 Hip adduction 5/5   Ankle dorsiflexion: 5/5 Ankle dorsiflexion: 5/5   Ankle plantarflexion: 5/5 Ankle plantarflexion: 5/5     Hip ER: R: 4/5, L: 4/5  Hip IR: R: 5/5, L: 5/5    Special Tests:  -Repeated Flexion: no change  -Repeated Ext: no change  -Active SLR: positive  -SLR: negative  -PORTER's negative   -OH Squat: functional, nonpainful     Palpation: TTP spinous process L4-5, thoracolumbar paraspinals       Flexibility: hamstring flexibility <70 deg SLR      Limitation/Restriction for FOTO lumbar Survey    Therapist reviewed FOTO scores for Liliya Moore on 7/6/2020.   FOTO documents entered into [employer] - see Media section.    Limitation Score: 55%         TREATMENT   Treatment Time In: " 3:25pm  Treatment Time Out: 3:35pm  Total Treatment time (time-based codes) separate from Evaluation: 10 minutes    Liliya received therapeutic exercises to develop strength, posture and core stabilization for 10 minutes including:    Transverse abdominus activation and instruction 5 min ([facility])  TVA with ball squeeze in hook lying x 10  S/L clams x 10 ea  Bilateral shoulder ER with YTB x 10    Home Exercises and Patient Education Provided    Education provided:   - education in log roll technique for supine to sit transfers and avoidance of valsalva maneuver with lifting  - Educated in activity tolerance to taking frequent rest breaks as needed for shortness of breath    Written Home Exercises Provided: yes.  Exercises were reviewed and Liliya was able to demonstrate them prior to the end of the session.  Liliya demonstrated good  understanding of the education provided.     See EMR under Patient Instructions for exercises provided 7/6/2020.    Assessment   Liliya is a 25 y.o. female referred to outpatient Physical Therapy with a medical diagnosis of Back pain affecting pregnancy. Patient presents with decreased thoracolumbar spine AROM, poor posture, B hip weakness, decreased core stability and motor control.     Patient prognosis is Good.   Patientt will benefit from skilled outpatient Physical Therapy to address the deficits stated above and in the chart below, provide patient /family education, and to maximize patientt's level of independence.     Plan of care discussed with patient: Yes  Patient's spiritual, cultural and educational needs considered and patient is agreeable to the plan of care and goals as stated below:     Anticipated Barriers for therapy: none    Medical Necessity is demonstrated by the following  History  Co-morbidities and personal factors that may impact the plan of care Co-morbidities:   asthma, pregnancy    Personal Factors:   no deficits     moderate   Examination  Body  Structures and Functions, activity limitations and participation restrictions that may impact the plan of care Body Regions:   back  trunk    Body Systems:    ROM  strength  balance  gait  transfers  motor control  motor learning    Participation Restrictions:   Walking, standing, lifting, household chores    Activity limitations:   Learning and applying knowledge  no deficits    General Tasks and Commands  no deficits    Communication  no deficits    Mobility  lifting and carrying objects  walking    Self care  no deficits    Domestic Life  shopping  cooking  doing house work (cleaning house, washing dishes, laundry)    Interactions/Relationships  no deficits    Life Areas  employment    Community and Social Life  no deficits         high   Clinical Presentation evolving clinical presentation with changing clinical characteristics moderate   Decision Making/ Complexity Score: moderate     Goals:  Short Term Goals: 6 weeks   1. Patient to demonstrate independence with log roll technique for supine <>sit transfers for prevention of diastasis recti  2. Patient to report decreased intensity of low back pain with ADL's by 30% or greater  3. Patient to be able to isolate transverse abdominus and hold contraction 10 sec or greater for improved management of condition     Long Term Goals: 12 weeks   1. Patient to be independent with home exercise program for improved self management of condition  2. Patient to have decreased subjective report of disability as noted by a score of <50% on the FOTO questionnaire   3. Patient to demonstrate independence in proper body mechanics and lifting techniques for improved pain and in preparation for caring for infant     Plan   Plan of care Certification: 7/6/2020 to 11/6/2020.    Outpatient Physical Therapy 1 times weekly for 12 weeks to include the following interventions: Manual Therapy, Moist Heat/ Ice, Neuromuscular Re-ed, Patient Education, Therapeutic Activites and Therapeutic  Exercise.     Ada Page, PT

## 2020-07-08 ENCOUNTER — OFFICE VISIT (OUTPATIENT)
Dept: NEUROLOGY | Facility: CLINIC | Age: 25
End: 2020-07-08
Payer: MEDICAID

## 2020-07-08 DIAGNOSIS — G44.40 MEDICATION OVERUSE HEADACHE: Primary | ICD-10-CM

## 2020-07-08 DIAGNOSIS — R51.9 FRONTAL HEADACHE: ICD-10-CM

## 2020-07-08 PROCEDURE — 99204 PR OFFICE/OUTPT VISIT, NEW, LEVL IV, 45-59 MIN: ICD-10-PCS | Mod: 95,,, | Performed by: PSYCHIATRY & NEUROLOGY

## 2020-07-08 PROCEDURE — 99204 OFFICE O/P NEW MOD 45 MIN: CPT | Mod: 95,,, | Performed by: PSYCHIATRY & NEUROLOGY

## 2020-07-08 RX ORDER — METHYLPREDNISOLONE 4 MG/1
TABLET ORAL
Qty: 1 PACKAGE | Refills: 0 | Status: SHIPPED | OUTPATIENT
Start: 2020-07-08 | End: 2020-07-13

## 2020-07-08 RX ORDER — LANOLIN ALCOHOL/MO/W.PET/CERES
400 CREAM (GRAM) TOPICAL DAILY
Qty: 90 TABLET | Refills: 3 | Status: SHIPPED | OUTPATIENT
Start: 2020-07-08

## 2020-07-08 RX ORDER — RIBOFLAVIN (VITAMIN B2) 100 MG
200 TABLET ORAL DAILY
Qty: 180 TABLET | Refills: 3 | Status: SHIPPED | OUTPATIENT
Start: 2020-07-08 | End: 2020-07-13 | Stop reason: SDUPTHER

## 2020-07-08 NOTE — LETTER
July 8, 2020      JOSI Streeter  [address]  [place] [address]           [facility]  [address]  [place] [address]  Phone: [phone]  Fax: [phone]          Patient: Liliya Moore   MR Number: [id number]   YOB: 1995   Date of Visit: 7/8/2020       Dear Kaylen Chavarria:    Thank you for referring Liliya Moore to me for evaluation. Attached you will find relevant portions of my assessment and plan of care.    If you have questions, please do not hesitate to call me. I look forward to following Liliya Moore along with you.    Sincerely,    Venkat Beverly MD    Enclosure  CC:  No Recipients    If you would like to receive this communication electronically, please contact [email or web address] or [phone] to request more information on [employer] Link access.    For providers and/or their staff who would like to refer a patient to [facility], please contact us through our one-stop-shop provider referral line, [facility], at [phone].    If you feel you have received this communication in error or would no longer like to receive these types of communications, please e-mail [email or web address]

## 2020-07-08 NOTE — PROGRESS NOTES
[facility]  Virtual Visit      [facility]  [facility] [place]    Date: 7/8/20  Patient Name: Liliya Moore   MRN: [id number]   PCP: Primary Doctor No  Referring Provider: Kaylen Chavarria PA    The patient location is: Workplace  The chief complaint leading to consultation is: headache  Visit type: Virtual visit with synchronous audio and video  Total time spent with patient: 15 minutes  Each patient to whom he or she provides medical services by telemedicine is:  (1) informed of the relationship between the physician and patient and the respective role of any other health care provider with respect to management of the patient; and (2) notified that he or she may decline to receive medical services by telemedicine and may withdraw from such care at any time.    Notes:     Assessment:   Liliya Moore is a 25 y.o. female Presenting for evaluation of headache.  Patient is currently pregnant, limiting treatment options.  Discussed Feroz options with will initiate magnesium and riboflavin for prophylaxis.  Have counseled patient on medication overuse headache, which is likely contributing substantially to her presentation.  Patient expressed understanding of this.  Plan:     Problem List Items Addressed This Visit     None      Visit Diagnoses     Medication overuse headache    -  Primary    Relevant Medications    methylPREDNISolone (MEDROL DOSEPACK) 4 mg tablet    Frontal headache        Relevant Medications    riboflavin, vitamin B2, (VITAMIN B-2) 100 mg Tab tablet    magnesium oxide (MAG-OX) 400 mg (241.3 mg magnesium) tablet          Venkat Beverly MD  [facility]   Department of Neurology    Patient note was created using MModal Dictation.  Any errors in syntax or even information may not have been identified and edited on initial review prior to signing this note.  Subjective:   Patient seen in consultation at the request of Kaylen Chavarria  JOSI HAMMOND for the evaluation of headache. A copy of this note will be sent to the referring physician.          HPI:   Ms. Liliya Moore is a 25 y.o. female presenting for evaluation of headache.  The patient, who is currently 20 weeks pregnant with twins, states she began developing headaches approximately week 12 of her pregnancy.  She has noted up treated her blood pressure, which has been followed by her OB, and initially attributed the headaches to this.  She briefly took magnesium but discontinued this after few days.  She states she notices a pounding headache every morning when she wakes up.  She has started taking Fioricet, which she is taking now daily.  She states initially was alleviating the headache but noted they would returned the following day.  She states that the headaches are now not as responsive to the Fioricet.  PAST MEDICAL HISTORY:  Past Medical History:   Diagnosis Date    Anemia     Asthma complicating pregnancy, antepartum 2/9/2015       PAST SURGICAL HISTORY:  History reviewed. No pertinent surgical history.    CURRENT MEDS:  Current Outpatient Medications   Medication Sig Dispense Refill    butalbital-acetaminophen-caffeine -40 mg (FIORICET, ESGIC) -40 mg per tablet Take 1 tablet by mouth every 4 (four) hours as needed for Pain. 15 tablet 0    cetirizine (ZYRTEC) 10 MG tablet Take 1 tablet (10 mg total) by mouth once daily. 10 tablet 0    fluticasone propionate (FLONASE) 50 mcg/actuation nasal spray 1 spray (50 mcg total) by Each Nostril route 2 (two) times daily as needed. 15 g 0    fluticasone-salmeterol 100-50 mcg/dose (ADVAIR DISKUS) 100-50 mcg/dose diskus inhaler Inhale 1 puff into the lungs 2 (two) times daily. 60 each 4    magnesium oxide (MAG-OX) 400 mg (241.3 mg magnesium) tablet Take 1 tablet (400 mg total) by mouth once daily. 90 tablet 3    methylPREDNISolone (MEDROL DOSEPACK) 4 mg tablet use as directed 1 Package 0    prenatal 21-iron fu-folic acid  (PRENATAL COMPLETE) 14 mg iron- 400 mcg Tab Take 1 tablet by mouth once daily. 30 tablet 0    riboflavin, vitamin B2, (VITAMIN B-2) 100 mg Tab tablet Take 2 tablets (200 mg total) by mouth once daily. 180 tablet 3     No current facility-administered medications for this visit.        ALLERGIES:  Review of patient's allergies indicates:  No Known Allergies    FAMILY HISTORY:  Family History   Problem Relation Age of Onset    Breast cancer Neg Hx     Colon cancer Neg Hx     Ovarian cancer Neg Hx        SOCIAL HISTORY:  Social History     Tobacco Use    Smoking status: Never Smoker    Smokeless tobacco: Never Used   Substance Use Topics    Alcohol use: No    Drug use: No       Review of Systems:  12 system review of systems is negative except for the symptoms mentioned in HPI.      Objective:   There were no vitals filed for this visit.  General: NAD, well nourished   Eyes: no tearing, discharge, no erythema   ENT: moist mucous membranes of the oral cavity, nares patent    Neck: Supple, full range of motion  Cardiovascular: Appears well perfused  Lungs: Normal work of breathing, normal chest wall excursions  Skin: No rash, lesions, or breakdown on exposed skin  Psychiatry: Mood and affect are appropriate   Abdomen: nondistended, non tender  Extremeties: No cyanosis, clubbing or edema visible    Neurological   MENTAL STATUS: Alert and oriented to person, place, and time. Attention and concentration within normal limits. Speech without dysarthria, able to name and repeat without difficulty. Recent and remote memory within normal limits   CRANIAL NERVES: Visual fields intact. PERRL. EOMI. Facial sensation intact. Face symmetrical. Hearing grossly intact. Full shoulder shrug bilaterally. Tongue protrudes midline   SENSORY: Sensation is intact to light touch throughout.    MOTOR: Normal bulk. 5/5 deltoid, biceps, triceps, interosseous, hand  bilaterally. 5/5 iliopsoas, knee extension/flexion, foot  dorsi/plantarflexion bilaterally.    REFLEXES: Deferred due to virtual visit  CEREBELLAR/COORDINATION/GAIT: Gait steady with normal arm swing and stride length.   Finger to nose intact. Normal rapid alternating movements.

## 2020-07-09 ENCOUNTER — PROCEDURE VISIT (OUTPATIENT)
Dept: MATERNAL FETAL MEDICINE | Facility: CLINIC | Age: 25
End: 2020-07-09
Attending: OBSTETRICS & GYNECOLOGY
Payer: MEDICAID

## 2020-07-09 ENCOUNTER — OFFICE VISIT (OUTPATIENT)
Dept: MATERNAL FETAL MEDICINE | Facility: CLINIC | Age: 25
End: 2020-07-09
Payer: MEDICAID

## 2020-07-09 DIAGNOSIS — O28.3 ECHOGENIC INTRACARDIAC FOCUS OF FETUS ON PRENATAL ULTRASOUND: ICD-10-CM

## 2020-07-09 DIAGNOSIS — Z36.89 ENCOUNTER FOR ULTRASOUND TO ASSESS FETAL GROWTH: ICD-10-CM

## 2020-07-09 DIAGNOSIS — O30.041 DICHORIONIC DIAMNIOTIC TWIN PREGNANCY IN FIRST TRIMESTER: ICD-10-CM

## 2020-07-09 DIAGNOSIS — O30.042 DICHORIONIC DIAMNIOTIC TWIN PREGNANCY IN SECOND TRIMESTER: ICD-10-CM

## 2020-07-09 PROCEDURE — 76811 PR US, OB FETAL EVAL & EXAM, TRANSABDOM,FIRST GESTATION: ICD-10-PCS | Mod: 26,S$PBB,, | Performed by: OBSTETRICS & GYNECOLOGY

## 2020-07-09 PROCEDURE — 99214 PR OFFICE/OUTPT VISIT, EST, LEVL IV, 30-39 MIN: ICD-10-PCS | Mod: 25,S$PBB,TH, | Performed by: OBSTETRICS & GYNECOLOGY

## 2020-07-09 PROCEDURE — 76811 OB US DETAILED SNGL FETUS: CPT | Mod: PBBFAC | Performed by: OBSTETRICS & GYNECOLOGY

## 2020-07-09 PROCEDURE — 76812 OB US DETAILED ADDL FETUS: CPT | Mod: 26,S$PBB,, | Performed by: OBSTETRICS & GYNECOLOGY

## 2020-07-09 PROCEDURE — 76812 OB US DETAILED ADDL FETUS: ICD-10-PCS | Mod: 26,S$PBB,, | Performed by: OBSTETRICS & GYNECOLOGY

## 2020-07-09 PROCEDURE — 76811 OB US DETAILED SNGL FETUS: CPT | Mod: 26,S$PBB,, | Performed by: OBSTETRICS & GYNECOLOGY

## 2020-07-09 PROCEDURE — 76812 OB US DETAILED ADDL FETUS: CPT | Mod: PBBFAC | Performed by: OBSTETRICS & GYNECOLOGY

## 2020-07-09 PROCEDURE — 99214 OFFICE O/P EST MOD 30 MIN: CPT | Mod: 25,S$PBB,TH, | Performed by: OBSTETRICS & GYNECOLOGY

## 2020-07-09 NOTE — PROGRESS NOTES
OB Ultrasound Report and consult note (see PDF version under imaging tab).    Indication  ========    Fetal anatomy survey; dichorionic twins    Pregnancy History  ==============    pt declined screening    Method  ======    Transabdominal ultrasound examination, 3D ultrasound examination. View: Suboptimal view: limited by maternal body habitus    Pregnancy  =========    Twin pregnancy. Dichorionic-diamniotic. Number of fetuses: 2    Dating  ======    LMP on: 2/18/2020  GA by LMP 20 w + 2 d  JANES by LMP: 11/24/2020  Ultrasound examination on: 7/9/2020  GA by U/S based upon: AC, BPD, Femur, HC  GA by U/S 20 w + 4 d  JANES by U/S: 11/22/2020  GA by U/S based upon (Fetus 2): AC, BPD, Femur, HC  GA by U/S (Fetus 2) 20 w + 4 d  JANES by U/S (Fetus 2): 11/22/2020  Assigned: based on ultrasound (Fetus 1: CRL), selected on 04/16/2020  Assigned GA 20 w + 2 d  Assigned JANES: 11/24/2020  Pregnancy length 280 d    General Evaluation (Fetus 1)  =====================    Cardiac activity present.  bpm.  Fetal movements visualized.  Presentation cephalic; rlq bottom.  Placenta Placental site: posterior.  Umbilical cord Cord vessels: 3 vessel cord. Insertion site: normal insertion.  Amniotic fluid Amount of AF: normal amount. MVP 3.3 cm.    Fetal Biometry (Fetus 1)  ===================    Standard  BPD 49.0 mm  20w 6d                Hadlock    OFD 61.5 mm  21w 1d                Sheila    .8 mm  20w 2d                Hadlock    Cerebellum tr 21.3 mm  20w 6d                Quinteros    .2 mm  20w 2d                Hadlock    Femur 34.3 mm  20w 6d                Hadlock    Humerus 31.9 mm  20w 5d                Sheila    HC / AC 1.19     g          28%        Paco    EFW discordance 1.1 %  EFW (lb) 0 lb  EFW (oz) 13 oz  EFW by: Hadlock (BPD-HC-AC-FL)  Extended   4.9 mm  CM 3.1 mm    Nasal bone 4.9 mm    Head / Face / Neck  Cephalic index 0.80    Extremities / Bony Struc  FL / BPD 0.70    FL / HC 0.19    FL /  AC 0.23    Other Structures   bpm    Fetal Anatomy (Fetus 1)  ===================    Cranium: normal  Lateral ventricles: normal  Choroid plexus: normal  Midline falx: normal  Cavum septi pellucidi: normal  Cerebellum: normal  Cisterna magna: normal  Head / Neck  Vermis: visualized  Neck: normal  Lips: normal  Profile: normal  Nose: normal  RVOT view: suboptimal  LVOT view: suboptimal  Heart / Thorax  4-chamber view: 4-chamber normal, septum suboptimal, echogenic focus  Situs: situs solitus (normal)  Aortic arch view: suboptimal  Ductal arch view: normal  SVC: suboptimal  IVC: suboptimal  3-vessel view: normal  3-vessel-trachea view: normal  Rt lung: normal  Lt lung: normal  Diaphragm: suboptimal  Cord insertion: normal  Stomach: normal  Kidneys: normal  Bladder: normal  Genitals: normal  Abdomen  Liver: normal  Bowel: normal  Rt renal artery: visualized  Lt renal artery: visualized  Cervical spine: normal  Thoracic spine: normal  Lumbar spine: normal  Sacral spine: normal  Rt arm: normal  Lt arm: normal  Rt hand: visualized  Lt hand: visualized  Rt leg: normal  Lt leg: normal  Rt foot: visualized  Lt foot: visualized  Position of hands: normal  Position of feet: normal  Wants to know gender: no    General Evaluation (Fetus 2)  =====================    Cardiac activity present.  bpm.  Fetal movements visualized.  Presentation transverse; LUQ top.  Placenta Placental site: anterior.  Umbilical cord Cord vessels: 3 vessel cord. Insertion site: normal insertion.  Amniotic fluid Amount of AF: normal amount. MVP 3.9 cm.    Fetal Biometry (Fetus 2)  ===================    Standard  BPD 48.9 mm  20w 6d                Hadlock    OFD 62.3 mm  21w 2d                Sheila    .7 mm  20w 2d                Hadlock    .0 mm  20w 4d                Hadlock    Femur 32.9 mm  20w 2d                Hadlock    Humerus 32.1 mm  20w 5d                Sheila    HC / AC 1.15     g          27%         Paco    EFW discordance 1.1 %  EFW (lb) 0 lb  EFW (oz) 12 oz  EFW by: Hadlock (BPD-HC-AC-FL)  Extended  Nasal bone 5.5 mm    Head / Face / Neck  Cephalic index 0.78    Extremities / Bony Struc  FL / BPD 0.67    FL / HC 0.19    FL / AC 0.21    Other Structures   bpm    Fetal Anatomy (Fetus 2)  ===================    Cranium: normal  Lateral ventricles: suboptimal  Choroid plexus: normal  Midline falx: normal  Cavum septi pellucidi: normal  Cerebellum: suboptimal  Cisterna magna: suboptimal  Head / Neck  Vermis: suboptimal  Neck: normal  Lips: normal  Profile: normal  Nose: normal  RVOT view: suboptimal  LVOT view: suboptimal  Heart / Thorax  4-chamber view: 4-chamber suboptimal, septum suboptimal  Situs: situs solitus (normal)  Aortic arch view: suboptimal  Ductal arch view: suboptimal  SVC: suboptimal  IVC: suboptimal  3-vessel view: suboptimal  3-vessel-trachea view: normal  Rt lung: suboptimal  Lt lung: suboptimal  Diaphragm: normal  Cord insertion: normal  Stomach: normal  Kidneys: normal  Bladder: normal  Genitals: normal  Abdomen  Liver: normal  Bowel: normal  Rt renal artery: visualized  Lt renal artery: visualized  Cervical spine: normal  Thoracic spine: normal  Lumbar spine: normal  Sacral spine: normal  Rt arm: normal  Rt hand: visualized  Lt hand: visualized  Rt leg: normal  Lt leg: normal  Rt foot: visualized  Lt foot: visualized  Position of hands: normal  Position of feet: normal  Wants to know gender: no    Maternal Structures  ===============    Uterus / Cervix  Approach: Transabdominal  Cervical length 50.1 mm  Ovaries / Tubes / Adnexa  Rt ovary: Normal  Lt ovary: Normal    Consultation  ==========    Type: Abnormal Ultrasound Finding  I spent 25 minutes on the consultation today with the patient and her partner regarding findings from today's ultrasound exam. More than 50%  of the consultation was spent in face-to-face counseling and coordination of care.    Referring MD: Dr. Dagoberto Posadas  discussed the significance of an echogenic focus in the ventricle of the fetal heart. This is not a true structural abnormality per se and is  not associated with congenital heart disease. It is usually seen as a normal variant in about 5% of pregnancies. Down syndrome fetuses have  a higher incidence of echogenic foci than normal fetuses, therefore it is considered to be risk factor for Down syndrome.  In a woman with other risk factors for Down syndrome (advanced maternal age, elevated quad screen risk or presence of other markers), the  presence of an echogenic focus may increase the relative risk of Down syndrome. If the patient is close to age 35, the presence of an  echogenic focus may increase the risk for Down syndrome to a level seen in women 35 years old or older. In a younger woman (< age 35) or in  a woman without other risk factors or markers for Down syndrome, the significance of an isolated echogenic focus is uncertain and often not  significant. The overwhelming majority (99%) of these fetuses will not have Down syndrome. However, because the patient has not undergone  any screening for Down Syndrome in this pregnancy, we reviewed the screening and diagnostic testing options available prenatally for Down  Syndrome. The risks, benefits, and limitations of all tests, in general and specifically in a twin pregnancy, were reviewed. The patient elected to  proceed with cfDNA testing today. At this point, she declines genetic amniocentesis.  F/U ultrasound study will be scheduled in 4 weeks to complete the anatomic surveys and to assess fetal growth.    Impression  =========    A dichorionic twin gestation is identified. Both fetal sizes are appropriate for established gestational age, and fetal sizes are concordant.  Detailed anatomic surveys are performed for both twins. No structural malformations are seen in either twin; however, the anatomic surveys are  incomplete as noted above.  Echogenic  intracardiac foci are seen in twin A.  Will attempt to complete the anatomic surveys at follow-up growth ultrasound exams. Placental locations are normal without evidence of previa.  Cervical length by TV scanning is normal.

## 2020-07-13 ENCOUNTER — LAB VISIT (OUTPATIENT)
Dept: LAB | Facility: OTHER | Age: 25
End: 2020-07-13
Attending: OBSTETRICS & GYNECOLOGY
Payer: MEDICAID

## 2020-07-13 ENCOUNTER — ROUTINE PRENATAL (OUTPATIENT)
Dept: OBSTETRICS AND GYNECOLOGY | Facility: CLINIC | Age: 25
End: 2020-07-13
Attending: OBSTETRICS & GYNECOLOGY
Payer: MEDICAID

## 2020-07-13 VITALS
WEIGHT: 286.19 LBS | DIASTOLIC BLOOD PRESSURE: 78 MMHG | BODY MASS INDEX: 52.34 KG/M2 | SYSTOLIC BLOOD PRESSURE: 124 MMHG

## 2020-07-13 DIAGNOSIS — J45.909 ASTHMA COMPLICATING PREGNANCY, ANTEPARTUM: ICD-10-CM

## 2020-07-13 DIAGNOSIS — A59.01 TRICHOMONAL VULVOVAGINITIS: ICD-10-CM

## 2020-07-13 DIAGNOSIS — O30.041 DICHORIONIC DIAMNIOTIC TWIN PREGNANCY IN FIRST TRIMESTER: ICD-10-CM

## 2020-07-13 DIAGNOSIS — R51.9 FRONTAL HEADACHE: ICD-10-CM

## 2020-07-13 DIAGNOSIS — O30.049 DICHORIONIC DIAMNIOTIC TWIN PREGNANCY, ANTEPARTUM: ICD-10-CM

## 2020-07-13 DIAGNOSIS — O99.519 ASTHMA COMPLICATING PREGNANCY, ANTEPARTUM: ICD-10-CM

## 2020-07-13 PROCEDURE — 87480 CANDIDA DNA DIR PROBE: CPT

## 2020-07-13 PROCEDURE — 99213 PR OFFICE/OUTPT VISIT, EST, LEVL III, 20-29 MIN: ICD-10-PCS | Mod: TH,S$PBB,, | Performed by: OBSTETRICS & GYNECOLOGY

## 2020-07-13 PROCEDURE — 87510 GARDNER VAG DNA DIR PROBE: CPT

## 2020-07-13 PROCEDURE — 99999 PR PBB SHADOW E&M-EST. PATIENT-LVL III: ICD-10-PCS | Mod: PBBFAC,,, | Performed by: OBSTETRICS & GYNECOLOGY

## 2020-07-13 PROCEDURE — 99213 OFFICE O/P EST LOW 20 MIN: CPT | Mod: TH,S$PBB,, | Performed by: OBSTETRICS & GYNECOLOGY

## 2020-07-13 PROCEDURE — 99999 PR PBB SHADOW E&M-EST. PATIENT-LVL III: CPT | Mod: PBBFAC,,, | Performed by: OBSTETRICS & GYNECOLOGY

## 2020-07-13 PROCEDURE — 83036 HEMOGLOBIN GLYCOSYLATED A1C: CPT

## 2020-07-13 PROCEDURE — 36415 COLL VENOUS BLD VENIPUNCTURE: CPT

## 2020-07-13 PROCEDURE — 99213 OFFICE O/P EST LOW 20 MIN: CPT | Mod: PBBFAC,TH | Performed by: OBSTETRICS & GYNECOLOGY

## 2020-07-13 RX ORDER — FAMOTIDINE 20 MG
1 TABLET ORAL DAILY
Qty: 90 TABLET | Refills: 4 | Status: SHIPPED | OUTPATIENT
Start: 2020-07-13 | End: 2020-08-12

## 2020-07-13 RX ORDER — ALBUTEROL SULFATE 108 UG/1
AEROSOL, METERED RESPIRATORY (INHALATION)
COMMUNITY
Start: 2020-05-22 | End: 2020-10-30 | Stop reason: SDUPTHER

## 2020-07-13 RX ORDER — MONTELUKAST SODIUM 10 MG/1
TABLET, FILM COATED ORAL
COMMUNITY
Start: 2020-05-14 | End: 2020-10-30 | Stop reason: SDUPTHER

## 2020-07-13 RX ORDER — RIBOFLAVIN (VITAMIN B2) 100 MG
200 TABLET ORAL DAILY
Qty: 180 TABLET | Refills: 3 | Status: SHIPPED | OUTPATIENT
Start: 2020-07-13

## 2020-07-13 RX ORDER — PANTOPRAZOLE SODIUM 40 MG/1
40 TABLET, DELAYED RELEASE ORAL DAILY
Qty: 30 TABLET | Refills: 1 | Status: SHIPPED | OUTPATIENT
Start: 2020-07-13 | End: 2021-07-13

## 2020-07-13 NOTE — PROGRESS NOTES
Occasional nausea and vomiting if not eating.  Vomited here x 1, otherwise doing well with reg,karina.  Started on Protonix today.  Reports that she is using her inhaler daily, had not started Singulair or any the allergy medicines or Flonase that had been previously prescribed.  Discussed starting daily singular and seeing where she is, if needed we will add back Advair.  Prescription for prenatal vitamins given.  Started on Protonix today.  Follow-up in 4 weeks.  Will need hemoglobin A1c today as was not drawn previously.  Ob glucose in 4 weeks.  Discussed echogenic intracardiac focus

## 2020-07-14 LAB
ESTIMATED AVG GLUCOSE: 91 MG/DL (ref 68–131)
HBA1C MFR BLD HPLC: 4.8 % (ref 4–5.6)

## 2020-07-17 LAB
CANDIDA RRNA VAG QL PROBE: NOT DETECTED
G VAGINALIS RRNA GENITAL QL PROBE: NOT DETECTED
T VAGINALIS RRNA GENITAL QL PROBE: DETECTED

## 2020-07-19 ENCOUNTER — PATIENT MESSAGE (OUTPATIENT)
Dept: OBSTETRICS AND GYNECOLOGY | Facility: CLINIC | Age: 25
End: 2020-07-19

## 2020-07-19 RX ORDER — METRONIDAZOLE 500 MG/1
500 TABLET ORAL EVERY 12 HOURS
Qty: 14 TABLET | Refills: 1 | Status: SHIPPED | OUTPATIENT
Start: 2020-07-19 | End: 2020-07-26

## 2020-07-23 ENCOUNTER — TELEPHONE (OUTPATIENT)
Dept: MATERNAL FETAL MEDICINE | Facility: CLINIC | Age: 25
End: 2020-07-23

## 2020-07-23 NOTE — TELEPHONE ENCOUNTER
"After review by Dr. Yee, patient was contacted and notified that [id number] test would need to be repeated. Per [facility], "quantity of DNA was not sufficient." Patient agreed to have lab repeated and will come in tomorrow 7/24/20 at 4pm for lab.      "

## 2020-08-03 ENCOUNTER — TELEPHONE (OUTPATIENT)
Dept: MATERNAL FETAL MEDICINE | Facility: CLINIC | Age: 25
End: 2020-08-03

## 2020-08-03 NOTE — TELEPHONE ENCOUNTER
RN gave results of [id number] to Dr Yee. She will be calling her with the results.       ----- Message from Velasquez Doe MA sent at 8/3/2020 12:11 PM CDT -----  Patient called to see if her lab results came back. She stated that she did MaterniT 21 testing.

## 2020-08-04 ENCOUNTER — TELEPHONE (OUTPATIENT)
Dept: OBSTETRICS AND GYNECOLOGY | Facility: CLINIC | Age: 25
End: 2020-08-04

## 2020-08-04 ENCOUNTER — TELEPHONE (OUTPATIENT)
Dept: MATERNAL FETAL MEDICINE | Facility: CLINIC | Age: 25
End: 2020-08-04

## 2020-08-04 NOTE — TELEPHONE ENCOUNTER
----- Message from Anitra Kaye sent at 8/4/2020  3:59 PM CDT -----   Name of Who is Calling:     What is the request in detail: patient request call back in reference to lab results Please contact to further discuss and advise      Can the clinic reply by [facility]: no     What [facility]:  [phone]

## 2020-08-04 NOTE — TELEPHONE ENCOUNTER
Patient called about [id number] results. RN gave results to Dr Yee so that she could call patient. RN spoke with Dr Yee and informed patient that Dr Yee would call her back with the results by the end of the day today.

## 2020-08-04 NOTE — TELEPHONE ENCOUNTER
Patient states that she spoke with Dr. Yee office in regards to insufficient cells on Materni T21 test.    Patient reported vaginal pressure to wear she feels like something is about to fall out,patient was instructed to report to L&D triage since it was after hours for evaluation,patient declined,patient was also offered an appointment tomorrow morning with Dr. Jeansonne for evaluation,patient also declined that appointment,patient is currently wearing a maternity belt to help with pressure,patient was given strict labor precautions,verbilzed understanding.

## 2020-08-05 NOTE — TELEPHONE ENCOUNTER
Results from cfDNA testing reviewed with the patient (by phone) on 8/4/20 at 1810.  Will discuss further at upcoming visit on 8/6/20.

## 2020-08-06 ENCOUNTER — INITIAL CONSULT (OUTPATIENT)
Dept: MATERNAL FETAL MEDICINE | Facility: CLINIC | Age: 25
End: 2020-08-06
Payer: MEDICAID

## 2020-08-06 ENCOUNTER — TELEPHONE (OUTPATIENT)
Dept: MATERNAL FETAL MEDICINE | Facility: CLINIC | Age: 25
End: 2020-08-06

## 2020-08-06 ENCOUNTER — PROCEDURE VISIT (OUTPATIENT)
Dept: MATERNAL FETAL MEDICINE | Facility: CLINIC | Age: 25
End: 2020-08-06
Payer: MEDICAID

## 2020-08-06 VITALS
SYSTOLIC BLOOD PRESSURE: 112 MMHG | WEIGHT: 146.63 LBS | BODY MASS INDEX: 26.81 KG/M2 | DIASTOLIC BLOOD PRESSURE: 78 MMHG

## 2020-08-06 DIAGNOSIS — O30.042 DICHORIONIC DIAMNIOTIC TWIN PREGNANCY IN SECOND TRIMESTER: ICD-10-CM

## 2020-08-06 DIAGNOSIS — O28.3 ECHOGENIC INTRACARDIAC FOCUS OF FETUS ON PRENATAL ULTRASOUND: ICD-10-CM

## 2020-08-06 DIAGNOSIS — O30.041 DICHORIONIC DIAMNIOTIC TWIN PREGNANCY IN FIRST TRIMESTER: ICD-10-CM

## 2020-08-06 DIAGNOSIS — Z36.89 ENCOUNTER FOR ULTRASOUND TO ASSESS FETAL GROWTH: ICD-10-CM

## 2020-08-06 DIAGNOSIS — Z36.89 ENCOUNTER FOR ULTRASOUND TO ASSESS FETAL GROWTH: Primary | ICD-10-CM

## 2020-08-06 PROCEDURE — 99999 PR PBB SHADOW E&M-EST. PATIENT-LVL II: ICD-10-PCS | Mod: PBBFAC,,, | Performed by: OBSTETRICS & GYNECOLOGY

## 2020-08-06 PROCEDURE — 76816 OB US FOLLOW-UP PER FETUS: CPT | Mod: 26,S$PBB,, | Performed by: OBSTETRICS & GYNECOLOGY

## 2020-08-06 PROCEDURE — 99999 PR PBB SHADOW E&M-EST. PATIENT-LVL II: CPT | Mod: PBBFAC,,, | Performed by: OBSTETRICS & GYNECOLOGY

## 2020-08-06 PROCEDURE — 76816 OB US FOLLOW-UP PER FETUS: CPT | Mod: PBBFAC | Performed by: OBSTETRICS & GYNECOLOGY

## 2020-08-06 PROCEDURE — 99212 OFFICE O/P EST SF 10 MIN: CPT | Mod: 25,S$PBB,TH, | Performed by: OBSTETRICS & GYNECOLOGY

## 2020-08-06 PROCEDURE — 76816 PR  US,PREGNANT UTERUS,F/U,TRANSABD APP: ICD-10-PCS | Mod: 26,59,S$PBB, | Performed by: OBSTETRICS & GYNECOLOGY

## 2020-08-06 PROCEDURE — 99212 PR OFFICE/OUTPT VISIT, EST, LEVL II, 10-19 MIN: ICD-10-PCS | Mod: 25,S$PBB,TH, | Performed by: OBSTETRICS & GYNECOLOGY

## 2020-08-06 PROCEDURE — 99212 OFFICE O/P EST SF 10 MIN: CPT | Mod: PBBFAC,TH | Performed by: OBSTETRICS & GYNECOLOGY

## 2020-08-11 ENCOUNTER — LAB VISIT (OUTPATIENT)
Dept: LAB | Facility: OTHER | Age: 25
End: 2020-08-11
Attending: OBSTETRICS & GYNECOLOGY
Payer: MEDICAID

## 2020-08-11 ENCOUNTER — ROUTINE PRENATAL (OUTPATIENT)
Dept: OBSTETRICS AND GYNECOLOGY | Facility: CLINIC | Age: 25
End: 2020-08-11
Attending: OBSTETRICS & GYNECOLOGY
Payer: MEDICAID

## 2020-08-11 VITALS — DIASTOLIC BLOOD PRESSURE: 74 MMHG | BODY MASS INDEX: 54.07 KG/M2 | WEIGHT: 293 LBS | SYSTOLIC BLOOD PRESSURE: 136 MMHG

## 2020-08-11 DIAGNOSIS — A59.01 TRICHOMONAL VULVOVAGINITIS: Primary | ICD-10-CM

## 2020-08-11 DIAGNOSIS — O30.049 DICHORIONIC DIAMNIOTIC TWIN PREGNANCY, ANTEPARTUM: ICD-10-CM

## 2020-08-11 DIAGNOSIS — O23.591 TRICHOMONAL VAGINITIS DURING PREGNANCY IN FIRST TRIMESTER: ICD-10-CM

## 2020-08-11 DIAGNOSIS — O30.041 DICHORIONIC DIAMNIOTIC TWIN PREGNANCY IN FIRST TRIMESTER: ICD-10-CM

## 2020-08-11 DIAGNOSIS — A59.01 TRICHOMONAL VAGINITIS DURING PREGNANCY IN FIRST TRIMESTER: ICD-10-CM

## 2020-08-11 LAB
BASOPHILS # BLD AUTO: 0.01 K/UL (ref 0–0.2)
BASOPHILS NFR BLD: 0.1 % (ref 0–1.9)
CANDIDA RRNA VAG QL PROBE: NEGATIVE
DIFFERENTIAL METHOD: ABNORMAL
EOSINOPHIL # BLD AUTO: 0.2 K/UL (ref 0–0.5)
EOSINOPHIL NFR BLD: 1.6 % (ref 0–8)
ERYTHROCYTE [DISTWIDTH] IN BLOOD BY AUTOMATED COUNT: 12.8 % (ref 11.5–14.5)
G VAGINALIS RRNA GENITAL QL PROBE: NEGATIVE
GLUCOSE SERPL-MCNC: 88 MG/DL (ref 70–140)
HCT VFR BLD AUTO: 37 % (ref 37–48.5)
HGB BLD-MCNC: 12.3 G/DL (ref 12–16)
IMM GRANULOCYTES # BLD AUTO: 0.06 K/UL (ref 0–0.04)
IMM GRANULOCYTES NFR BLD AUTO: 0.6 % (ref 0–0.5)
LYMPHOCYTES # BLD AUTO: 1.5 K/UL (ref 1–4.8)
LYMPHOCYTES NFR BLD: 15.3 % (ref 18–48)
MCH RBC QN AUTO: 29.6 PG (ref 27–31)
MCHC RBC AUTO-ENTMCNC: 33.2 G/DL (ref 32–36)
MCV RBC AUTO: 89 FL (ref 82–98)
MONOCYTES # BLD AUTO: 1.1 K/UL (ref 0.3–1)
MONOCYTES NFR BLD: 11.7 % (ref 4–15)
NEUTROPHILS # BLD AUTO: 6.7 K/UL (ref 1.8–7.7)
NEUTROPHILS NFR BLD: 70.7 % (ref 38–73)
NRBC BLD-RTO: 0 /100 WBC
PLATELET # BLD AUTO: 270 K/UL (ref 150–350)
PMV BLD AUTO: 8.8 FL (ref 9.2–12.9)
RBC # BLD AUTO: 4.16 M/UL (ref 4–5.4)
T VAGINALIS RRNA GENITAL QL PROBE: NEGATIVE
WBC # BLD AUTO: 9.52 K/UL (ref 3.9–12.7)

## 2020-08-11 PROCEDURE — 99213 PR OFFICE/OUTPT VISIT, EST, LEVL III, 20-29 MIN: ICD-10-PCS | Mod: TH,S$PBB,, | Performed by: OBSTETRICS & GYNECOLOGY

## 2020-08-11 PROCEDURE — 99999 PR PBB SHADOW E&M-EST. PATIENT-LVL II: CPT | Mod: PBBFAC,,, | Performed by: OBSTETRICS & GYNECOLOGY

## 2020-08-11 PROCEDURE — 36415 COLL VENOUS BLD VENIPUNCTURE: CPT

## 2020-08-11 PROCEDURE — 85025 COMPLETE CBC W/AUTO DIFF WBC: CPT

## 2020-08-11 PROCEDURE — 99999 PR PBB SHADOW E&M-EST. PATIENT-LVL II: ICD-10-PCS | Mod: PBBFAC,,, | Performed by: OBSTETRICS & GYNECOLOGY

## 2020-08-11 PROCEDURE — 99213 OFFICE O/P EST LOW 20 MIN: CPT | Mod: TH,S$PBB,, | Performed by: OBSTETRICS & GYNECOLOGY

## 2020-08-11 PROCEDURE — 87480 CANDIDA DNA DIR PROBE: CPT

## 2020-08-11 PROCEDURE — 82950 GLUCOSE TEST: CPT

## 2020-08-11 PROCEDURE — 87510 GARDNER VAG DNA DIR PROBE: CPT

## 2020-08-11 PROCEDURE — 99212 OFFICE O/P EST SF 10 MIN: CPT | Mod: PBBFAC,TH | Performed by: OBSTETRICS & GYNECOLOGY

## 2020-08-11 RX ORDER — METRONIDAZOLE 500 MG/1
500 TABLET ORAL EVERY 12 HOURS
Qty: 14 TABLET | Refills: 0 | Status: SHIPPED | OUTPATIENT
Start: 2020-08-11 | End: 2020-08-18

## 2020-08-11 NOTE — PROGRESS NOTES
Repeat vaginal cultures today with discussion of longer course of flagyl if no resolution of trich today.  Patient  Doing well with no complaints.   Ob glucose screen today.  Tdap with next visit.  Strict precautions given.  BP stable  Awaiting op note from [facility], patient still desires , discussed limitations in terms of position of babies.  F/U 4 weeks.

## 2020-08-12 ENCOUNTER — TELEPHONE (OUTPATIENT)
Dept: OBSTETRICS AND GYNECOLOGY | Facility: CLINIC | Age: 25
End: 2020-08-12

## 2020-08-12 NOTE — TELEPHONE ENCOUNTER
----- Message from Yolanda Anne sent at 8/12/2020  3:16 PM CDT -----  Patient would like to speak with Nurse. Patient did not want to go into detail for the reason of the call    Patient can be reached at [phone]      Returned patient called, patient states she called the doctor office to see about getting her records and was told she would have to come into clinic. Patient states she would rather us to get the records. Patient gave fax number to call [phone].

## 2020-09-01 ENCOUNTER — TELEPHONE (OUTPATIENT)
Dept: OBSTETRICS AND GYNECOLOGY | Facility: CLINIC | Age: 25
End: 2020-09-01

## 2020-09-01 NOTE — TELEPHONE ENCOUNTER
----- Message from Sneha Roach sent at 9/1/2020 12:04 PM CDT -----  Contact: BARRY SNIDER [[id number]]  Type: Patient Call Back    Who called: BARRY SNIDER [[id number]]    What is the request in detail: Patient is requesting a call back. She did not disclose the reason for the call.   Please advise.    [facility]    Best call back number:[phone]    Additional Information: N/A          Returned patient call, patient was calling to check on medical records. Inform patient we still have not received any records from the office. Patient states she will call them again.

## 2020-09-03 ENCOUNTER — TELEPHONE (OUTPATIENT)
Dept: OBSTETRICS AND GYNECOLOGY | Facility: CLINIC | Age: 25
End: 2020-09-03

## 2020-09-03 NOTE — TELEPHONE ENCOUNTER
----- Message from Kody Avila sent at 9/2/2020  3:14 PM CDT -----      Name of Who is Calling: BARRY SNIDER [[id number]]      What is the request in detail: Pt called to speak with staff and would like a call back to confirm if records was received.Please contact to further discuss and advise.          Can the clinic reply by [facility]: N      What [facility]: [phone]        Returned patient called, patient states she spoke with the doctor office and was told in order to receive the records we have to put the office name on it. Inform patient I will refax the release over this morning with the name on it. Patient verbalized and understand

## 2020-09-04 ENCOUNTER — INITIAL CONSULT (OUTPATIENT)
Dept: MATERNAL FETAL MEDICINE | Facility: CLINIC | Age: 25
End: 2020-09-04
Payer: MEDICAID

## 2020-09-04 ENCOUNTER — TELEPHONE (OUTPATIENT)
Dept: PEDIATRIC CARDIOLOGY | Facility: CLINIC | Age: 25
End: 2020-09-04

## 2020-09-04 ENCOUNTER — ROUTINE PRENATAL (OUTPATIENT)
Dept: OBSTETRICS AND GYNECOLOGY | Facility: CLINIC | Age: 25
End: 2020-09-04
Attending: OBSTETRICS & GYNECOLOGY
Payer: MEDICAID

## 2020-09-04 ENCOUNTER — PROCEDURE VISIT (OUTPATIENT)
Dept: MATERNAL FETAL MEDICINE | Facility: CLINIC | Age: 25
End: 2020-09-04
Payer: MEDICAID

## 2020-09-04 VITALS — WEIGHT: 293 LBS | DIASTOLIC BLOOD PRESSURE: 78 MMHG | BODY MASS INDEX: 54.4 KG/M2 | SYSTOLIC BLOOD PRESSURE: 122 MMHG

## 2020-09-04 VITALS — SYSTOLIC BLOOD PRESSURE: 110 MMHG | WEIGHT: 293 LBS | BODY MASS INDEX: 54.4 KG/M2 | DIASTOLIC BLOOD PRESSURE: 70 MMHG

## 2020-09-04 DIAGNOSIS — O30.041 DICHORIONIC DIAMNIOTIC TWIN PREGNANCY IN FIRST TRIMESTER: Primary | ICD-10-CM

## 2020-09-04 DIAGNOSIS — O30.041 DICHORIONIC DIAMNIOTIC TWIN PREGNANCY IN FIRST TRIMESTER: ICD-10-CM

## 2020-09-04 DIAGNOSIS — Z36.89 ENCOUNTER FOR ULTRASOUND TO ASSESS FETAL GROWTH: Primary | ICD-10-CM

## 2020-09-04 DIAGNOSIS — Z36.89 ENCOUNTER FOR ULTRASOUND TO ASSESS FETAL GROWTH: ICD-10-CM

## 2020-09-04 DIAGNOSIS — O99.210 MATERNAL OBESITY, ANTEPARTUM: ICD-10-CM

## 2020-09-04 PROCEDURE — 76816 PR  US,PREGNANT UTERUS,F/U,TRANSABD APP: ICD-10-PCS | Mod: 26,S$PBB,, | Performed by: OBSTETRICS & GYNECOLOGY

## 2020-09-04 PROCEDURE — 99999 PR PBB SHADOW E&M-EST. PATIENT-LVL III: ICD-10-PCS | Mod: PBBFAC,,, | Performed by: OBSTETRICS & GYNECOLOGY

## 2020-09-04 PROCEDURE — 76816 OB US FOLLOW-UP PER FETUS: CPT | Mod: 26,S$PBB,, | Performed by: OBSTETRICS & GYNECOLOGY

## 2020-09-04 PROCEDURE — 99213 OFFICE O/P EST LOW 20 MIN: CPT | Mod: TH,S$PBB,, | Performed by: OBSTETRICS & GYNECOLOGY

## 2020-09-04 PROCEDURE — 99999 PR PBB SHADOW E&M-EST. PATIENT-LVL III: CPT | Mod: PBBFAC,,, | Performed by: OBSTETRICS & GYNECOLOGY

## 2020-09-04 PROCEDURE — 99213 OFFICE O/P EST LOW 20 MIN: CPT | Mod: S$PBB,TH,25, | Performed by: OBSTETRICS & GYNECOLOGY

## 2020-09-04 PROCEDURE — 99213 PR OFFICE/OUTPT VISIT, EST, LEVL III, 20-29 MIN: ICD-10-PCS | Mod: S$PBB,TH,25, | Performed by: OBSTETRICS & GYNECOLOGY

## 2020-09-04 PROCEDURE — 99213 OFFICE O/P EST LOW 20 MIN: CPT | Mod: PBBFAC,TH | Performed by: OBSTETRICS & GYNECOLOGY

## 2020-09-04 PROCEDURE — 76816 OB US FOLLOW-UP PER FETUS: CPT | Mod: 59,PBBFAC | Performed by: OBSTETRICS & GYNECOLOGY

## 2020-09-04 PROCEDURE — 99213 PR OFFICE/OUTPT VISIT, EST, LEVL III, 20-29 MIN: ICD-10-PCS | Mod: TH,S$PBB,, | Performed by: OBSTETRICS & GYNECOLOGY

## 2020-09-04 PROCEDURE — 99213 OFFICE O/P EST LOW 20 MIN: CPT | Mod: PBBFAC,27,TH,25 | Performed by: OBSTETRICS & GYNECOLOGY

## 2020-09-04 NOTE — TELEPHONE ENCOUNTER
Called patient to schedule fetal echo for Di Di twins at [facility].  Discussed with patient the current visitor policy - 1 adult support person allowed. Patient asked if she could bring her toddler to appointment - explained that current visitor policy only allowed 1 adult - no children under age of 18 at this time.  Also, discussed with patient that if she needs to cancel appointment- will not be able to reschedule after 30 weeks gestation. Patient verbalized understanding of information provided.

## 2020-09-04 NOTE — PROGRESS NOTES
Doing well, MFM scan today, reports that growth is good.  Discussed TDaP and flu, awaiting records for possible .  F/U in 2 weeks

## 2020-09-06 NOTE — PROGRESS NOTES
Maternal Fetal Medicine Follow Up Visit    SUBJECTIVE:     Ms. Liliya Gaston is a 25 y.o.  female with IUP at 28w4d who is seen today for follow up visit by MFM.  Pregnancy complications include: Di/Di Twin IUP, insufficient NIPT x 2, obesity.    Original MFM consult on 2020 with Dr. Yee.    Previous notes reviewed.   No changes to medical, surgical, family, social, or obstetric history.    Interval history since last MFM visit:   Today patient remains without any complaints.  The patient denies any leakage of fluid, vaginal bleeding, contractions or cramping.    Care team members:  Band - Primary OB provider     OBJECTIVE:     Blood Pressure:   Vitals:    20 1045   BP: 110/70     Weight: 134.9kg    Physical Exam:  Deferred    Ultrasound performed. See viewpoint for full ultrasound report.    ASSESSMENT/PLAN:     25 y.o.  female with IUP at 28w4d     MIHIR TWINS  Overall doing well.  I shared the results of her ultrasound scan today. Growth is reassuring and concordant.  I shared that we are still unable to clear the cardiac views of one of the infants. For this, we will refer to pediatric cardiology for a fetal ECHO.   We still see the EIF (previously seen) in Twin A.   I again reviewed the NIPT results and EIF and again offered diagnostic testing as was previously offered. The patient declines.  I also briefly reviewed the increased risks in pregnancy associated with twin gestations.  Recommend starting weekly  testing for this pregnancy at 32 weeks. (should be ordered and arranged by primary OB provider).  RTC in 4 weeks for growth.    The patient was given an opportunity to ask questions about the management of her high risk pregnancy problems. She expressed an understanding of and agreement to the above impression and plan. All questions were answered to her satisfaction.    Time spent in visit today: 15 min, >50% of which was face-to-face time with the  patient.      Frankie Hanson MD   Maternal-Fetal Medicine

## 2020-09-08 ENCOUNTER — TELEPHONE (OUTPATIENT)
Dept: OBSTETRICS AND GYNECOLOGY | Facility: CLINIC | Age: 25
End: 2020-09-08

## 2020-09-08 NOTE — TELEPHONE ENCOUNTER
----- Message from Susan Moore sent at 9/8/2020  8:30 AM CDT -----  Name of Who is Calling:BARRY SNIDER [[id number]]     What is the request in detail: patient is requesting a call back regards to medical records Please contact to further discuss and advise      Can the clinic reply by [facility]: no     What Number to Call Back if not in [facility]:  [phone] (home)   \          Returned patient call, inform patient I still have not received any records. Patient states she's in [place] now she will go get the records.

## 2020-09-11 ENCOUNTER — TELEPHONE (OUTPATIENT)
Dept: OBSTETRICS AND GYNECOLOGY | Facility: CLINIC | Age: 25
End: 2020-09-11

## 2020-09-11 NOTE — TELEPHONE ENCOUNTER
----- Message from Sybil Yeboah sent at 9/11/2020  9:25 AM CDT -----  Contact: BARRY SNIDER [[id number]]  Name of Who is Calling: BARRY SNIDER [[id number]]      What is the request in detail: Pt requesting call back from staff. Please call      Can the clinic reply by [facility]: no      What Number to Call Back if not in [facility]: [phone] (home)            Returned patient call, patient states she will  the records and bring it with her to her next appointment.

## 2020-09-15 ENCOUNTER — NURSE TRIAGE (OUTPATIENT)
Dept: ADMINISTRATIVE | Facility: CLINIC | Age: 25
End: 2020-09-15

## 2020-09-15 NOTE — TELEPHONE ENCOUNTER
"Pt 30 weeks pregnant with twins. Pt reports she is feeling cramping on and off for 15 minutes. Pt states she just woke up from a nap. Pt has not timed cramping, and is not exactly sure how far apart episodes are. Patient directed to lie down on left side, rest, and drink water. Patient advised to time contractions for an hour. Patient told to go to LD for evaluation if cramping is less that ten minutes apart for an hour, if she has fluid leaking from vagina, vaginal bleeding, or notices decreased fetal movement per protocol.    Reason for Disposition   [1] Mild contractions AND [2] > 10 minutes apart    Additional Information   Negative: Passed out (i.e., lost consciousness, collapsed and was not responding)   Negative: Shock suspected (e.g., cold/pale/clammy skin, too weak to stand, low BP, rapid pulse)   Negative: Difficult to awaken or acting confused (e.g., disoriented, slurred speech)   Negative: [1] SEVERE abdominal pain (e.g., excruciating) AND [2] constant AND [3] present > 1 hour   Negative: Severe bleeding (e.g., continuous red blood from vagina, or large blood clots)   Negative: Umbilical cord hanging out of the vagina (shiny, white, curled appearance, "like telephone cord")   Negative: Uncontrollable urge to push (i.e., feels like baby is coming out now)   Negative: Can see baby   Negative: Sounds like a life-threatening emergency to the triager   Negative: MODERATE-SEVERE abdominal pain   Negative: Contractions < 10 minutes apart for 1 hour (i.e., 6 or more contractions an hour)   Negative: [1] Contractions > 10 minutes apart AND [2] persist > 24 hours AND [3] no improvement using CARE ADVICE   Negative: [1] Contractions AND [2] any vaginal bleeding (including: red blood, clots, spotting, or pink/brown mucous)   Negative: Vaginal bleeding  (Exception: brief spotting after intercourse or pelvic exam, or slight pinkish or brownish mucous discharge)   Negative: Leakage of fluid from " vagina   Negative: [1] Baby moving less today (e.g., kick count < 5 in 1 hour or < 10 in 2 hours) AND [2] pregnant 23 or more weeks   Negative: No movement of baby for 8 hours   Negative: Severe headache or headache that won't go away   Negative: New blurred vision or vision changes   Negative: Fever > 100.4 F (38.0 C)   Negative: Increased pressure in pelvic area   Negative: [1] Lower back discomfort AND [2] not relieved by rest   Negative: Has a cerclage (i.e., a procedure where the obstetrician stitches shut the cervix)   Negative: Pinkish or brownish mucous discharge  (Exception: brief spotting after intercourse or pelvic exam)   Negative: Patient sounds very sick or weak to the triager   Negative: Baby has dropped   Negative: Increase in vaginal discharge   Negative: Diarrhea   Negative: Prior history of  labor    Protocols used: PREGNANCY - LABOR - COERMPY-X-AM

## 2020-09-16 ENCOUNTER — OFFICE VISIT (OUTPATIENT)
Dept: PEDIATRIC CARDIOLOGY | Facility: CLINIC | Age: 25
End: 2020-09-16
Attending: PEDIATRICS
Payer: MEDICAID

## 2020-09-16 ENCOUNTER — CLINICAL SUPPORT (OUTPATIENT)
Dept: PEDIATRIC CARDIOLOGY | Facility: CLINIC | Age: 25
End: 2020-09-16
Attending: OBSTETRICS & GYNECOLOGY
Payer: MEDICAID

## 2020-09-16 ENCOUNTER — ROUTINE PRENATAL (OUTPATIENT)
Dept: OBSTETRICS AND GYNECOLOGY | Facility: CLINIC | Age: 25
End: 2020-09-16
Attending: OBSTETRICS & GYNECOLOGY
Payer: MEDICAID

## 2020-09-16 ENCOUNTER — TELEPHONE (OUTPATIENT)
Dept: OBSTETRICS AND GYNECOLOGY | Facility: CLINIC | Age: 25
End: 2020-09-16

## 2020-09-16 ENCOUNTER — HOSPITAL ENCOUNTER (EMERGENCY)
Facility: OTHER | Age: 25
Discharge: HOME | End: 2020-09-16
Attending: OBSTETRICS & GYNECOLOGY
Payer: MEDICAID

## 2020-09-16 VITALS — WEIGHT: 293 LBS | DIASTOLIC BLOOD PRESSURE: 68 MMHG | BODY MASS INDEX: 55.31 KG/M2 | SYSTOLIC BLOOD PRESSURE: 110 MMHG

## 2020-09-16 VITALS
HEART RATE: 92 BPM | SYSTOLIC BLOOD PRESSURE: 116 MMHG | WEIGHT: 293 LBS | DIASTOLIC BLOOD PRESSURE: 64 MMHG | HEIGHT: 62 IN | BODY MASS INDEX: 53.92 KG/M2

## 2020-09-16 VITALS
DIASTOLIC BLOOD PRESSURE: 79 MMHG | TEMPERATURE: 98 F | HEART RATE: 112 BPM | SYSTOLIC BLOOD PRESSURE: 136 MMHG | RESPIRATION RATE: 18 BRPM

## 2020-09-16 DIAGNOSIS — O30.043 DICHORIONIC DIAMNIOTIC TWIN PREGNANCY IN THIRD TRIMESTER: ICD-10-CM

## 2020-09-16 DIAGNOSIS — E66.9 OBESITY, UNSPECIFIED CLASSIFICATION, UNSPECIFIED OBESITY TYPE, UNSPECIFIED WHETHER SERIOUS COMORBIDITY PRESENT: ICD-10-CM

## 2020-09-16 DIAGNOSIS — O99.210 MATERNAL OBESITY, ANTEPARTUM: ICD-10-CM

## 2020-09-16 DIAGNOSIS — O28.3 ECHOGENIC INTRACARDIAC FOCUS OF FETUS ON PRENATAL ULTRASOUND: ICD-10-CM

## 2020-09-16 DIAGNOSIS — O99.210 MATERNAL OBESITY, ANTEPARTUM: Primary | ICD-10-CM

## 2020-09-16 DIAGNOSIS — Z3A.30 30 WEEKS GESTATION OF PREGNANCY: ICD-10-CM

## 2020-09-16 DIAGNOSIS — R10.9 ABDOMINAL PAIN, UNSPECIFIED ABDOMINAL LOCATION: Primary | ICD-10-CM

## 2020-09-16 DIAGNOSIS — O99.519 ASTHMA COMPLICATING PREGNANCY, ANTEPARTUM: Primary | ICD-10-CM

## 2020-09-16 DIAGNOSIS — J45.909 ASTHMA COMPLICATING PREGNANCY, ANTEPARTUM: Primary | ICD-10-CM

## 2020-09-16 PROCEDURE — 76827 PR  SO2 FETAL HEART DOPPLER: ICD-10-PCS | Mod: 26,59,S$PBB, | Performed by: PEDIATRICS

## 2020-09-16 PROCEDURE — 99203 PR OFFICE/OUTPT VISIT, NEW, LEVL III, 30-44 MIN: ICD-10-PCS | Mod: 25,S$PBB,, | Performed by: PEDIATRICS

## 2020-09-16 PROCEDURE — 59025 PR FETAL 2N-STRESS TEST: ICD-10-PCS | Mod: 26,22,, | Performed by: OBSTETRICS & GYNECOLOGY

## 2020-09-16 PROCEDURE — 99284 EMERGENCY DEPT VISIT MOD MDM: CPT | Mod: 25,27

## 2020-09-16 PROCEDURE — 76827 ECHO EXAM OF FETAL HEART: CPT | Mod: PBBFAC | Performed by: PEDIATRICS

## 2020-09-16 PROCEDURE — 99213 OFFICE O/P EST LOW 20 MIN: CPT | Mod: PBBFAC,25 | Performed by: PEDIATRICS

## 2020-09-16 PROCEDURE — 93325 DOPPLER ECHO COLOR FLOW MAPG: CPT | Mod: PBBFAC | Performed by: PEDIATRICS

## 2020-09-16 PROCEDURE — 59025 FETAL NON-STRESS TEST: CPT | Mod: 26,22,, | Performed by: OBSTETRICS & GYNECOLOGY

## 2020-09-16 PROCEDURE — 99999 PR PBB SHADOW E&M-EST. PATIENT-LVL III: ICD-10-PCS | Mod: PBBFAC,,, | Performed by: PEDIATRICS

## 2020-09-16 PROCEDURE — 93325 PR DOPPLER COLOR FLOW VELOCITY MAP: ICD-10-PCS | Mod: 26,S$PBB,, | Performed by: PEDIATRICS

## 2020-09-16 PROCEDURE — 76825 ECHO EXAM OF FETAL HEART: CPT | Mod: 26,S$PBB,, | Performed by: PEDIATRICS

## 2020-09-16 PROCEDURE — 93325 DOPPLER ECHO COLOR FLOW MAPG: CPT | Mod: 26,59,S$PBB, | Performed by: PEDIATRICS

## 2020-09-16 PROCEDURE — 76827 ECHO EXAM OF FETAL HEART: CPT | Mod: 26,S$PBB,, | Performed by: PEDIATRICS

## 2020-09-16 PROCEDURE — 99212 OFFICE O/P EST SF 10 MIN: CPT | Mod: PBBFAC,27,TH,25 | Performed by: OBSTETRICS & GYNECOLOGY

## 2020-09-16 PROCEDURE — 76827 PR  SO2 FETAL HEART DOPPLER: ICD-10-PCS | Mod: 26,S$PBB,, | Performed by: PEDIATRICS

## 2020-09-16 PROCEDURE — 99213 PR OFFICE/OUTPT VISIT, EST, LEVL III, 20-29 MIN: ICD-10-PCS | Mod: TH,S$PBB,25, | Performed by: OBSTETRICS & GYNECOLOGY

## 2020-09-16 PROCEDURE — 76825 ECHO EXAM OF FETAL HEART: CPT | Mod: PBBFAC | Performed by: PEDIATRICS

## 2020-09-16 PROCEDURE — 93325 DOPPLER ECHO COLOR FLOW MAPG: CPT | Mod: 26,S$PBB,, | Performed by: PEDIATRICS

## 2020-09-16 PROCEDURE — 99284 EMERGENCY DEPT VISIT MOD MDM: CPT | Mod: 25,,, | Performed by: OBSTETRICS & GYNECOLOGY

## 2020-09-16 PROCEDURE — 59025 FETAL NON-STRESS TEST: CPT

## 2020-09-16 PROCEDURE — 99284 PR EMERGENCY DEPT VISIT,LEVEL IV: ICD-10-PCS | Mod: 25,,, | Performed by: OBSTETRICS & GYNECOLOGY

## 2020-09-16 PROCEDURE — 76827 ECHO EXAM OF FETAL HEART: CPT | Mod: 26,59,S$PBB, | Performed by: PEDIATRICS

## 2020-09-16 PROCEDURE — 99213 OFFICE O/P EST LOW 20 MIN: CPT | Mod: TH,S$PBB,25, | Performed by: OBSTETRICS & GYNECOLOGY

## 2020-09-16 PROCEDURE — 99999 PR PBB SHADOW E&M-EST. PATIENT-LVL III: CPT | Mod: PBBFAC,,, | Performed by: PEDIATRICS

## 2020-09-16 PROCEDURE — 76825 PR  SO2 FETAL HEART: ICD-10-PCS | Mod: 26,S$PBB,, | Performed by: PEDIATRICS

## 2020-09-16 PROCEDURE — 76825 ECHO EXAM OF FETAL HEART: CPT | Mod: 26,59,S$PBB, | Performed by: PEDIATRICS

## 2020-09-16 PROCEDURE — 76825 PR  SO2 FETAL HEART: ICD-10-PCS | Mod: 26,59,S$PBB, | Performed by: PEDIATRICS

## 2020-09-16 PROCEDURE — 99999 PR PBB SHADOW E&M-EST. PATIENT-LVL II: CPT | Mod: PBBFAC,,, | Performed by: OBSTETRICS & GYNECOLOGY

## 2020-09-16 PROCEDURE — 99999 PR PBB SHADOW E&M-EST. PATIENT-LVL II: ICD-10-PCS | Mod: PBBFAC,,, | Performed by: OBSTETRICS & GYNECOLOGY

## 2020-09-16 PROCEDURE — 99203 OFFICE O/P NEW LOW 30 MIN: CPT | Mod: 25,S$PBB,, | Performed by: PEDIATRICS

## 2020-09-16 PROCEDURE — 93325 PR DOPPLER COLOR FLOW VELOCITY MAP: ICD-10-PCS | Mod: 26,59,S$PBB, | Performed by: PEDIATRICS

## 2020-09-16 NOTE — ED PROVIDER NOTES
Encounter Date: 2020       History     Chief Complaint   Patient presents with    Abdominal Pain     Liliya Gaston is a 25 y.o. [id number] at 30w1d presents complaining of contractions. Contractions started yesterday and have been 15-20 mins apart since that time. Pt was sent from Dr. Arenas's clinic and was checked there, found to be closed. Denies headache, shortness of breath, edema, RUQ pain, and vision changes. Patient reports contractions, denies vaginal bleeding, denies LOF. Fetal Movement: normal.     This IUP is complicated by di-di twins, trich in T1 (neg STARLA), MO, and asthma.           Review of patient's allergies indicates:  No Known Allergies  Past Medical History:   Diagnosis Date    Anemia     Asthma complicating pregnancy, antepartum 2015     No past surgical history on file.  Family History   Problem Relation Age of Onset    Breast cancer Neg Hx     Colon cancer Neg Hx     Ovarian cancer Neg Hx      Social History     Tobacco Use    Smoking status: Never Smoker    Smokeless tobacco: Never Used   Substance Use Topics    Alcohol use: No    Drug use: No     Review of Systems   Constitutional: Negative for chills, fatigue and fever.   HENT: Negative for congestion and sore throat.    Eyes: Negative for visual disturbance.   Respiratory: Negative for shortness of breath.    Cardiovascular: Negative for chest pain.   Gastrointestinal: Positive for abdominal pain. Negative for nausea and vomiting.   Genitourinary: Positive for pelvic pain. Negative for dysuria, vaginal bleeding and vaginal discharge.   Musculoskeletal: Negative for back pain.   Skin: Negative for rash.   Neurological: Negative for weakness and headaches.   Hematological: Does not bruise/bleed easily.       Physical Exam     Initial Vitals [20 1215]   BP Pulse Resp Temp SpO2   136/79 (!) 112 18 97.8 °F (36.6 °C) --      MAP       --         Physical Exam    Vitals reviewed.  Constitutional: She appears  well-developed and well-nourished. She is not diaphoretic. No distress.   Morbidly obese   HENT:   Head: Normocephalic and atraumatic.   Eyes: Conjunctivae and EOM are normal.   Neck: Normal range of motion. Neck supple.   Cardiovascular: Normal rate.   Pulmonary/Chest:   Normal work of breathing   Abdominal: Soft. There is no abdominal tenderness. There is no rebound and no guarding.   Gravid uterus   Musculoskeletal: Normal range of motion.   Neurological: She is alert and oriented to person, place, and time.   Skin: Skin is warm and dry.   Psychiatric: She has a normal mood and affect. Thought content normal.     OB LABOR EXAM:       Method: Sterile vaginal exam per MD.       Dilatation: 0.   Station: -3.   Effacement: 60%.       Comments: Initial SVE in clinic per Dr. Arenas closed >> repeat exam at 2 hours closed/60/-3       ED Course   Obtain Fetal nonstress test (NST)    Date/Time: 2020 12:30 PM  Performed by: Abena Rivers MD  Authorized by: Abena Rivers MD     Nonstress Test:     Uterine Irritability: Yes      Contractions:  Not present    Contraction Frequency:  N/A  Post-procedure:      Twin A: 125 bpm, moderate variability, +accels, -decels, Category 1, reactive and reassuring  Twin B: 130 bpm, moderate variability, +accels, -decels, Category 1, reactive and reassuring      Labs Reviewed - No data to display       Imaging Results    None          Medical Decision Making:   Initial Assessment:   Liliya Gaston is a 25 y.o. [id number] at 30w1d presents complaining of contractions.    ED Management:  VSS. FHT: reactive and reassuring x 2. No contractions noted on tocometer. Urine collected in clinic WNL. Will p.o. hydrate and perform 2-hour labor rule-out.    Initial SVE in clinic per Dr. Dagoberto montes >> repeat exam at 2 hours closed/60/-3. Patient reports contractions have spaced after p.o. hydration and now just feel like mild cramps, feeling improved.    Patient encouraged to increase p.o.  hydration. Will discharge home in stable condition with strict PTL precautions. RTC with Dr. Arenas on .    Other:   I have discussed this case with another health care provider.       <> Summary of the Discussion: Dr. Fernández              Attending Attestation:   Physician Attestation Statement for Resident:  As the supervising MD   Physician Attestation Statement: I have personally seen and examined this patient.   I agree with the above history. -:   As the supervising MD I agree with the above PE.    As the supervising MD I agree with the above treatment, course, plan, and disposition.   -:   NST A  I independently reviewed the fetal non-stress test with the following interpretation:  125 BPM baseline  Variability: moderate  Accelerations: present  Decelerations: absent  Contractions: none  Category 1    Clinical Interpretation:reactive    NST B  I independently reviewed the fetal non-stress test with the following interpretation:  130 BPM baseline  Variability: moderate  Accelerations: present  Decelerations: absent  Contractions: none  Category 1    Clinical Interpretation:reactive    Patient evaluated and found to be stable, agree with resident's assessment of twins with pelvic pressure but no s/s  labor or placental abruption and plan to discharge to home with precautions.  I was personally present during the critical portions of the procedure(s) performed by the resident and was immediately available in the ED to provide services and assistance as needed during the entire procedure.  I have reviewed the following: old records at this facility.                              Clinical Impression:     ICD-10-CM ICD-9-CM   1. Abdominal pain, unspecified abdominal location  R10.9 789.00   2. Obesity, unspecified classification, unspecified obesity type, unspecified whether serious comorbidity present  E66.9 278.00   3. 30 weeks gestation of pregnancy  Z3A.30 V22.2   4. Dichorionic diamniotic twin pregnancy  in third trimester  O30.043 651.03     V91.03                          ED Disposition Condition    Discharge Stable        ED Prescriptions     None        Follow-up Information    None                     Swapna Bryant M.D.  OB/GYN PGY-2     Swapna Bryant MD  Resident  09/16/20 1418       Roshni Fernández MD  09/16/20 9513

## 2020-09-16 NOTE — TELEPHONE ENCOUNTER
Spoke with patient to see if patient still christofer. Patient states she's not christofer as much. Patient is on her way to her appointment to be seen by Dr Arenas.

## 2020-09-16 NOTE — PROGRESS NOTES
had the pleasure of evaluating Liliya Gaston who is now 25 y.o.  and carrying her 3rd pregnancy at 30 1/7 weeks gestation. She is carrying dichorionic, diamniotic twins . She was referred for evaluation of the fetal heart due to intracardiac echogenic focus in twin A (male) and difficulty demonstrating cardiac structure and function in both fetuses on MFM evaluations.        Current Outpatient Medications:     magnesium oxide (MAG-OX) 400 mg (241.3 mg magnesium) tablet, Take 1 tablet (400 mg total) by mouth once daily. (Patient not taking: Reported on 9/4/2020), Disp: 90 tablet, Rfl: 3    pantoprazole (PROTONIX) 40 MG tablet, Take 1 tablet (40 mg total) by mouth once daily. (Patient not taking: Reported on 9/4/2020), Disp: 30 tablet, Rfl: 1    prenatal 21-iron fu-folic acid (PRENATAL COMPLETE) 14 mg iron- 400 mcg Tab, Take 1 tablet by mouth once daily., Disp: 90 tablet, Rfl: 4    PROVENTIL HFA 90 mcg/actuation inhaler, , Disp: , Rfl:     riboflavin, vitamin B2, (VITAMIN B-2) 100 mg Tab tablet, Take 2 tablets (200 mg total) by mouth once daily. (Patient not taking: Reported on 9/4/2020), Disp: 180 tablet, Rfl: 3    SINGULAIR 10 mg tablet, , Disp: , Rfl:   Review of patient's allergies indicates:  No Known Allergies    Maternal factors increasing risk for congenital heart disease: Unremarkable.  Paternal factors increasing risk for congenital heart disease: Unremarkable    FETAL ECHOCARDIOGRAM (summary):  TWIN A:  Dichorionic, diamniotic twin gestation with male twin caudad and vertex in position:  Normal fetal cardiac connections and function for estimated gestational age.    TWIN B:  Dichorionic, diamniotic twin gestation with female twin craniad and transverse in position:  Normal fetal cardiac connections and function for estimated gestational age.  (Full report in electronic medical record)    I reviewed the strengths and weaknesses of fetal echocardiography. I also reviewed the current studies. The  echocardiograms today demonstrate normal anatomical connections and function for the estimated gestational age in both fetuses. Within the limitations imposed by fetal physiology, I would expect a high probability that these infants will be born with normal hearts.    I did review the concern of increased incidence of patent ductus arteriosus in premature infants because of the increased risk for early delivery in twin gestation.  I did this primarily to reassure her that this is not an unusual finding and can typically be managed without surgery if there is a persistent ductus arteriosus and intervention becomes necessary.    An intracardiac echogenic focus has been reported on [facility] ultrasound studies. I did not appreciate any unusual echodensity on the study today.  Ms. Gaston had many questions regarding this observation on previous studies and I I reviewed the issue of echodense foci. I went over the initial association reported with Down syndrome and subsequent papers refuting this association. In the absence of other risk factors for Down syndrome, the prevailing  opinion is that the observation of echogenic foci has no predictive validity for the health and well being of the infant after delivery.  Based on the study today, I do not think there is any indication for follow-up for this concern.    I answered all the mother's questions. I also provided an information sheet reviewing the fetal physiology and how this compares to normal  physiology. This  also includes a reiteration of the limitations of fetal echocardiography that I have discussed today. I have not scheduled another evaluation; however, if questions arise later during gestation or after delivery, I would be happy to assist in any way. Ms. Gaston is comfortable with the plan.    RECOMMENDATIONS:  Evaluation of the infants after delivery if the clinical exam is abnormal        Note:  Over 50% of visit in consultation with the family >30  minutes

## 2020-09-16 NOTE — PROGRESS NOTES
Patient with abdominal pain, cramping.  Fetal echo today, normal.  Discussed options, will send to L&D for evaluation and monitoring to see if christofer. Cervix closed on SVE.  Discussed support belt.  All precautions reviewed.   F/U in 2 weeks or PRN

## 2020-09-16 NOTE — LETTER
September 16, 2020        Radha Arenas,   [address]  [address]  [place] [facility] [address]             [facility]  [address]  [facility] [address]  Phone: [phone]  Fax: [phone]   Patient: Liliya Gaston   MR Number: [id number]   YOB: 1995   Date of Visit: 9/16/2020       Dear Dr. Arenas:    Thank you for referring Liliya Gaston to me for evaluation. Below are the relevant portions of my assessment and plan of care.            If you have questions, please do not hesitate to call me. I look forward to following Liliya along with you.    Sincerely,      Paul Merchant MD           CC  No Recipients

## 2020-09-16 NOTE — LETTER
September 16, 2020      Frankie Hanson MD  [address]  [address]  [facility] [address]           [id number]  [address]  [facility] [address]  Phone: [phone]  Fax: [phone]          Patient: Liliya Gaston   MR Number: [id number]   YOB: 1995   Date of Visit: 9/16/2020       Dear Dr. Frankie Hanson:    Thank you for referring Liliya Gaston to me for evaluation. Attached you will find relevant portions of my assessment and plan of care.    If you have questions, please do not hesitate to call me. I look forward to following Liliya Gaston along with you.    Sincerely,    Paul Merchant MD    Enclosure  CC:  No Recipients    If you would like to receive this communication electronically, please contact [email or web address] or [phone] to request more information on [facility] [employer] access.    For providers and/or their staff who would like to refer a patient to [facility], please contact us through our one-stop-shop provider referral line, [facility], at [phone].    If you feel you have received this communication in error or would no longer like to receive these types of communications, please e-mail [email or web address]

## 2020-09-18 ENCOUNTER — TELEPHONE (OUTPATIENT)
Dept: OBSTETRICS AND GYNECOLOGY | Facility: CLINIC | Age: 25
End: 2020-09-18

## 2020-09-18 NOTE — TELEPHONE ENCOUNTER
----- Message from Lori Harris sent at 9/18/2020 11:53 AM CDT -----  Name of Who is Calling:BARRY SNIDER [[id number]] What is the request in detail: Patient is calling to speak to someone in the office in regards to changing her appointment from 09/30 to 10/06 the same day as her u/s. Please advise Can the clinic reply by [facility]: Yes What [facility]: [phone]        Spoke with patient reschedule patient appointment on the same day of her ultrasound. Patient verbalized and understand

## 2020-09-23 ENCOUNTER — TELEPHONE (OUTPATIENT)
Dept: OBSTETRICS AND GYNECOLOGY | Facility: CLINIC | Age: 25
End: 2020-09-23

## 2020-09-23 NOTE — TELEPHONE ENCOUNTER
Called [facility] in [place] spoke to someone in medical records regarding patient. I faxed over the release of information to the number given.

## 2020-09-28 ENCOUNTER — TELEPHONE (OUTPATIENT)
Dept: OBSTETRICS AND GYNECOLOGY | Facility: CLINIC | Age: 25
End: 2020-09-28

## 2020-09-28 ENCOUNTER — PATIENT MESSAGE (OUTPATIENT)
Dept: OBSTETRICS AND GYNECOLOGY | Facility: CLINIC | Age: 25
End: 2020-09-28

## 2020-10-06 ENCOUNTER — ROUTINE PRENATAL (OUTPATIENT)
Dept: OBSTETRICS AND GYNECOLOGY | Facility: CLINIC | Age: 25
End: 2020-10-06
Attending: OBSTETRICS & GYNECOLOGY
Payer: MEDICAID

## 2020-10-06 ENCOUNTER — PROCEDURE VISIT (OUTPATIENT)
Dept: MATERNAL FETAL MEDICINE | Facility: CLINIC | Age: 25
End: 2020-10-06
Attending: OBSTETRICS & GYNECOLOGY
Payer: MEDICAID

## 2020-10-06 VITALS — SYSTOLIC BLOOD PRESSURE: 134 MMHG | BODY MASS INDEX: 55.99 KG/M2 | WEIGHT: 293 LBS | DIASTOLIC BLOOD PRESSURE: 78 MMHG

## 2020-10-06 DIAGNOSIS — Z36.89 ENCOUNTER FOR ULTRASOUND TO ASSESS FETAL GROWTH: ICD-10-CM

## 2020-10-06 DIAGNOSIS — O30.043 DICHORIONIC DIAMNIOTIC TWIN PREGNANCY IN THIRD TRIMESTER: Primary | ICD-10-CM

## 2020-10-06 DIAGNOSIS — O30.043 DICHORIONIC DIAMNIOTIC TWIN PREGNANCY IN THIRD TRIMESTER: ICD-10-CM

## 2020-10-06 PROCEDURE — 99212 OFFICE O/P EST SF 10 MIN: CPT | Mod: PBBFAC,TH,25 | Performed by: OBSTETRICS & GYNECOLOGY

## 2020-10-06 PROCEDURE — 76816 OB US FOLLOW-UP PER FETUS: CPT | Mod: 26,S$PBB,, | Performed by: OBSTETRICS & GYNECOLOGY

## 2020-10-06 PROCEDURE — 99213 PR OFFICE/OUTPT VISIT, EST, LEVL III, 20-29 MIN: ICD-10-PCS | Mod: TH,S$PBB,, | Performed by: OBSTETRICS & GYNECOLOGY

## 2020-10-06 PROCEDURE — 76819 FETAL BIOPHYS PROFIL W/O NST: CPT | Mod: 26,S$PBB,, | Performed by: OBSTETRICS & GYNECOLOGY

## 2020-10-06 PROCEDURE — 76816 OB US FOLLOW-UP PER FETUS: CPT | Mod: PBBFAC | Performed by: OBSTETRICS & GYNECOLOGY

## 2020-10-06 PROCEDURE — 99999 PR PBB SHADOW E&M-EST. PATIENT-LVL II: ICD-10-PCS | Mod: PBBFAC,,, | Performed by: OBSTETRICS & GYNECOLOGY

## 2020-10-06 PROCEDURE — 99213 OFFICE O/P EST LOW 20 MIN: CPT | Mod: TH,S$PBB,, | Performed by: OBSTETRICS & GYNECOLOGY

## 2020-10-06 PROCEDURE — 76819 FETAL BIOPHYS PROFIL W/O NST: CPT | Mod: 59,PBBFAC | Performed by: OBSTETRICS & GYNECOLOGY

## 2020-10-06 PROCEDURE — 99999 PR PBB SHADOW E&M-EST. PATIENT-LVL II: CPT | Mod: PBBFAC,,, | Performed by: OBSTETRICS & GYNECOLOGY

## 2020-10-06 PROCEDURE — 76816 PR  US,PREGNANT UTERUS,F/U,TRANSABD APP: ICD-10-PCS | Mod: 26,S$PBB,, | Performed by: OBSTETRICS & GYNECOLOGY

## 2020-10-06 PROCEDURE — 76819 PR US, OB, FETAL BIOPHYSICAL, W/O NST: ICD-10-PCS | Mod: 26,S$PBB,, | Performed by: OBSTETRICS & GYNECOLOGY

## 2020-10-06 NOTE — PROGRESS NOTES
Some pressure and pelvic pain.  Cervix closed.  Bp is higher for her, no HA, vision changes or RUQ pain, strict precautions given.  F/U in 1 week.

## 2020-10-08 ENCOUNTER — TELEPHONE (OUTPATIENT)
Dept: OBSTETRICS AND GYNECOLOGY | Facility: CLINIC | Age: 25
End: 2020-10-08

## 2020-10-08 NOTE — TELEPHONE ENCOUNTER
Pt wanted to know if we received her records from [place], [place] on yesterday. Pat was informed that no records have come for her, on yesterday and today. Will check with Dr. Arenas to see if she received them.      ----- Message from Esther Cordova sent at 10/8/2020  2:14 PM CDT -----  Who Called: BARRY SNIDER     What is the reqeust in detail: Pt would like to speak to staff in regards her pregnancy, pt did not state what she was calling in regards to specifically. Please advise.     Can the clinic reply by [facility]?: Yes    Best Call Back Number: [phone]

## 2020-10-09 ENCOUNTER — HOSPITAL ENCOUNTER (EMERGENCY)
Facility: OTHER | Age: 25
Discharge: HOME | End: 2020-10-09
Attending: OBSTETRICS & GYNECOLOGY
Payer: MEDICAID

## 2020-10-09 DIAGNOSIS — O36.8132 DECREASED FETAL MOVEMENTS IN THIRD TRIMESTER, FETUS 2 OF MULTIPLE GESTATION: Primary | ICD-10-CM

## 2020-10-09 DIAGNOSIS — Z3A.33 33 WEEKS GESTATION OF PREGNANCY: ICD-10-CM

## 2020-10-09 PROCEDURE — 59025 FETAL NON-STRESS TEST: CPT

## 2020-10-09 PROCEDURE — 99284 EMERGENCY DEPT VISIT MOD MDM: CPT | Mod: 25

## 2020-10-10 NOTE — ED PROVIDER NOTES
Encounter Date: 10/9/2020       History     Chief Complaint   Patient presents with    Decreased Fetal Movement     Liliya Gaston is a 25 y.o. [id number] at 33w3d presents complaining of decreased fetal movement. Reports that she stopped feeling Twin B move around 5 PM.  States she performed kick counts for 2 hours without any movements despite drinking juice. States baby A has been moving appropriately. Reports that baby B started moving when she arrived in the [facility] and she is feeling better.   This IUP is complicated by di-di twins, trichomonas infection in 1T (neg STARLA), MO.  Patient denies contractions, denies vaginal bleeding, denies LOF.   Fetal Movement: decreased in B, normal in A.          Review of patient's allergies indicates:  No Known Allergies  Past Medical History:   Diagnosis Date    Anemia     Asthma complicating pregnancy, antepartum 2015     History reviewed. No pertinent surgical history.  Family History   Problem Relation Age of Onset    Breast cancer Neg Hx     Colon cancer Neg Hx     Ovarian cancer Neg Hx      Social History     Tobacco Use    Smoking status: Never Smoker    Smokeless tobacco: Never Used   Substance Use Topics    Alcohol use: No    Drug use: No     Review of Systems   Constitutional: Negative for fever.   HENT: Negative for sore throat.    Respiratory: Negative for shortness of breath.    Cardiovascular: Negative for chest pain.   Gastrointestinal: Negative for nausea.   Genitourinary: Negative for dysuria.   Musculoskeletal: Negative for back pain.   Skin: Negative for rash.   Neurological: Negative for weakness.   Hematological: Does not bruise/bleed easily.       Physical Exam     Initial Vitals   BP Pulse Resp Temp SpO2   -- -- -- -- --      MAP       --         Physical Exam    Nursing note and vitals reviewed.  Constitutional: She appears well-developed and well-nourished.   HENT:   Head: Normocephalic and atraumatic.   Eyes: Conjunctivae and EOM are  normal. Pupils are equal, round, and reactive to light.   Neck: Normal range of motion. Neck supple.   Cardiovascular: Normal rate.   Pulmonary/Chest: No respiratory distress.   Musculoskeletal: Normal range of motion.   Neurological: She is alert and oriented to person, place, and time. She has normal strength and normal reflexes.   Skin: Skin is warm and dry.   Psychiatric: She has a normal mood and affect. Her behavior is normal. Judgment and thought content normal.         ED Course   Obtain Fetal nonstress test (NST)    Date/Time: 10/9/2020 9:36 PM  Performed by: Candace Hsieh MD  Authorized by: Candace Hsieh MD     Nonstress Test:     Variability:  6-25 BPM    Decelerations:  None    Accelerations:  15 bpm    Acoustic Stimulator: No      Baseline:  130    Contractions:  Not present  Biophysical Profile:     Nonstress Test Interpretation: reactive      Overall Impression:  Reassuring  Post-procedure:     Patient tolerance:  Patient tolerated the procedure well with no immediate complications      Labs Reviewed - No data to display       Imaging Results    None          Medical Decision Making:   ED Management:  NST RR   Educated about kick counts. Strict  labor and decreased fetal movement precautions discussed. Advised to follow up with primary OB as scheduled.                              Clinical Impression:     ICD-10-CM ICD-9-CM   1. Decreased fetal movements in third trimester, fetus 2 of multiple gestation  O36.8132 655.73   2. 33 weeks gestation of pregnancy  Z3A.33 V22.2                          ED Disposition Condition    Discharge Stable        ED Prescriptions     None        Follow-up Information    None                     Candace Hsieh MD   PGY-1, OB-GYN                     Candace Hsieh MD  Resident  10/09/20 8433

## 2020-10-12 ENCOUNTER — TELEPHONE (OUTPATIENT)
Dept: OBSTETRICS AND GYNECOLOGY | Facility: CLINIC | Age: 25
End: 2020-10-12

## 2020-10-12 NOTE — TELEPHONE ENCOUNTER
----- Message from Mell Edmond sent at 10/12/2020 11:41 AM CDT -----  Contact: BARRY SNIDER [[id number]]  Type:  Patient Returning Call    Who Called: BARRY SNIDER [[id number]]    Who Left Message for Patient: Sushma    Does the patient know what this is regarding?: Yes    Can the clinic reply in [facility]: No    Best Call Back Number: ..[phone]    Additional Information: N/A        Returned patient call, patient states she still waiting on the medical records from [place]. Inform patient as of today we still have not received them. Patient verbalized and understand

## 2020-10-12 NOTE — TELEPHONE ENCOUNTER
----- Message from Susan Moore sent at 10/12/2020  9:56 AM CDT -----  Name of Who is Calling: BARRY SNIDER [[id number]]    What is the request in detail: Pt is requesting a call back...Please contact to further discuss and advise      [facility]     What Number to Call Back if not in [facility]:  [phone] (home)    Can the clinic reply in [facility]:       Returned patient call, patient states she was checking on her records that was supposed to be sent over. Inform patient we still have not received them and inform patient to try calling the hospital not clinic.

## 2020-10-13 ENCOUNTER — HOSPITAL ENCOUNTER (OUTPATIENT)
Dept: PERINATAL CARE | Facility: OTHER | Age: 25
Discharge: HOME | End: 2020-10-13
Attending: OBSTETRICS & GYNECOLOGY
Payer: MEDICAID

## 2020-10-13 ENCOUNTER — ROUTINE PRENATAL (OUTPATIENT)
Dept: OBSTETRICS AND GYNECOLOGY | Facility: CLINIC | Age: 25
End: 2020-10-13
Attending: OBSTETRICS & GYNECOLOGY
Payer: MEDICAID

## 2020-10-13 VITALS — SYSTOLIC BLOOD PRESSURE: 140 MMHG | DIASTOLIC BLOOD PRESSURE: 76 MMHG | BODY MASS INDEX: 56.56 KG/M2 | WEIGHT: 293 LBS

## 2020-10-13 DIAGNOSIS — O34.219 HISTORY OF CESAREAN DELIVERY AFFECTING PREGNANCY: ICD-10-CM

## 2020-10-13 DIAGNOSIS — O30.043 DICHORIONIC DIAMNIOTIC TWIN PREGNANCY IN THIRD TRIMESTER: ICD-10-CM

## 2020-10-13 DIAGNOSIS — O99.519 ASTHMA COMPLICATING PREGNANCY, ANTEPARTUM: Primary | ICD-10-CM

## 2020-10-13 DIAGNOSIS — J45.909 ASTHMA COMPLICATING PREGNANCY, ANTEPARTUM: Primary | ICD-10-CM

## 2020-10-13 PROCEDURE — 99213 OFFICE O/P EST LOW 20 MIN: CPT | Mod: PBBFAC,25,TH | Performed by: OBSTETRICS & GYNECOLOGY

## 2020-10-13 PROCEDURE — 76815 PR  US,PREGNANT UTERUS,LIMITED, 1/> FETUSES: ICD-10-PCS | Mod: 26,,, | Performed by: OBSTETRICS & GYNECOLOGY

## 2020-10-13 PROCEDURE — 76815 OB US LIMITED FETUS(S): CPT | Mod: 26,,, | Performed by: OBSTETRICS & GYNECOLOGY

## 2020-10-13 PROCEDURE — 99213 PR OFFICE/OUTPT VISIT, EST, LEVL III, 20-29 MIN: ICD-10-PCS | Mod: TH,S$PBB,25, | Performed by: OBSTETRICS & GYNECOLOGY

## 2020-10-13 PROCEDURE — 99213 OFFICE O/P EST LOW 20 MIN: CPT | Mod: TH,S$PBB,25, | Performed by: OBSTETRICS & GYNECOLOGY

## 2020-10-13 PROCEDURE — 76815 OB US LIMITED FETUS(S): CPT

## 2020-10-13 PROCEDURE — 59025 FETAL NON-STRESS TEST: CPT

## 2020-10-13 PROCEDURE — 87147 CULTURE TYPE IMMUNOLOGIC: CPT

## 2020-10-13 PROCEDURE — 59025 FETAL NON-STRESS TEST: CPT | Mod: 26,59,, | Performed by: OBSTETRICS & GYNECOLOGY

## 2020-10-13 PROCEDURE — 59025 PR FETAL 2N-STRESS TEST: ICD-10-PCS | Mod: 26,,, | Performed by: OBSTETRICS & GYNECOLOGY

## 2020-10-13 PROCEDURE — 99999 PR PBB SHADOW E&M-EST. PATIENT-LVL III: CPT | Mod: PBBFAC,,, | Performed by: OBSTETRICS & GYNECOLOGY

## 2020-10-13 PROCEDURE — 99999 PR PBB SHADOW E&M-EST. PATIENT-LVL III: ICD-10-PCS | Mod: PBBFAC,,, | Performed by: OBSTETRICS & GYNECOLOGY

## 2020-10-13 PROCEDURE — 87081 CULTURE SCREEN ONLY: CPT

## 2020-10-13 NOTE — PROGRESS NOTES
Repeat /62  To PNt if pressure still elevated there will go to OB ED.  Precautions reviewed for pre-e ros.  Called PNT to ask for BP monitoring.  All precautions reviewed.  F/U in 1 week or PRN

## 2020-10-15 ENCOUNTER — HOSPITAL ENCOUNTER (OUTPATIENT)
Dept: PERINATAL CARE | Facility: OTHER | Age: 25
Discharge: HOME | End: 2020-10-15
Attending: OBSTETRICS & GYNECOLOGY
Payer: MEDICAID

## 2020-10-15 DIAGNOSIS — O30.043 DICHORIONIC DIAMNIOTIC TWIN PREGNANCY IN THIRD TRIMESTER: ICD-10-CM

## 2020-10-15 LAB — BACTERIA SPEC AEROBE CULT: ABNORMAL

## 2020-10-15 PROCEDURE — 59025 PR FETAL 2N-STRESS TEST: ICD-10-PCS | Mod: 26,,, | Performed by: OBSTETRICS & GYNECOLOGY

## 2020-10-15 PROCEDURE — 76815 OB US LIMITED FETUS(S): CPT | Mod: 26,,, | Performed by: OBSTETRICS & GYNECOLOGY

## 2020-10-15 PROCEDURE — 59025 FETAL NON-STRESS TEST: CPT | Mod: 26,,, | Performed by: OBSTETRICS & GYNECOLOGY

## 2020-10-15 PROCEDURE — 59025 FETAL NON-STRESS TEST: CPT

## 2020-10-15 PROCEDURE — 76815 OB US LIMITED FETUS(S): CPT

## 2020-10-15 PROCEDURE — 76815 PR  US,PREGNANT UTERUS,LIMITED, 1/> FETUSES: ICD-10-PCS | Mod: 26,,, | Performed by: OBSTETRICS & GYNECOLOGY

## 2020-10-21 ENCOUNTER — PATIENT MESSAGE (OUTPATIENT)
Dept: OBSTETRICS AND GYNECOLOGY | Facility: CLINIC | Age: 25
End: 2020-10-21

## 2020-10-23 ENCOUNTER — CLINICAL SUPPORT (OUTPATIENT)
Dept: OBSTETRICS AND GYNECOLOGY | Facility: CLINIC | Age: 25
End: 2020-10-23
Attending: OBSTETRICS & GYNECOLOGY
Payer: MEDICAID

## 2020-10-23 ENCOUNTER — HOSPITAL ENCOUNTER (OUTPATIENT)
Dept: PERINATAL CARE | Facility: OTHER | Age: 25
Discharge: HOME | End: 2020-10-23
Attending: OBSTETRICS & GYNECOLOGY
Payer: MEDICAID

## 2020-10-23 VITALS — BODY MASS INDEX: 56.88 KG/M2 | DIASTOLIC BLOOD PRESSURE: 76 MMHG | WEIGHT: 293 LBS | SYSTOLIC BLOOD PRESSURE: 132 MMHG

## 2020-10-23 DIAGNOSIS — O30.043 DICHORIONIC DIAMNIOTIC TWIN PREGNANCY IN THIRD TRIMESTER: ICD-10-CM

## 2020-10-23 DIAGNOSIS — Z23 FLU VACCINE NEED: Primary | ICD-10-CM

## 2020-10-23 DIAGNOSIS — B95.1 POSITIVE GBS TEST: ICD-10-CM

## 2020-10-23 DIAGNOSIS — O99.213 OBESITY COMPLICATING PREGNANCY, THIRD TRIMESTER: ICD-10-CM

## 2020-10-23 DIAGNOSIS — O34.219 HISTORY OF CESAREAN DELIVERY AFFECTING PREGNANCY: Primary | ICD-10-CM

## 2020-10-23 DIAGNOSIS — J45.909 ASTHMA COMPLICATING PREGNANCY, ANTEPARTUM: ICD-10-CM

## 2020-10-23 DIAGNOSIS — O99.210 MATERNAL OBESITY, ANTEPARTUM: ICD-10-CM

## 2020-10-23 DIAGNOSIS — O99.519 ASTHMA COMPLICATING PREGNANCY, ANTEPARTUM: ICD-10-CM

## 2020-10-23 DIAGNOSIS — Z23 NEED FOR DIPHTHERIA-TETANUS-PERTUSSIS (TDAP) VACCINE: ICD-10-CM

## 2020-10-23 PROBLEM — O23.591 TRICHOMONAL VAGINITIS DURING PREGNANCY IN FIRST TRIMESTER: Status: RESOLVED | Noted: 2020-04-20 | Resolved: 2020-10-23

## 2020-10-23 PROBLEM — A59.01 TRICHOMONAL VAGINITIS DURING PREGNANCY IN FIRST TRIMESTER: Status: RESOLVED | Noted: 2020-04-20 | Resolved: 2020-10-23

## 2020-10-23 PROCEDURE — 99212 OFFICE O/P EST SF 10 MIN: CPT | Mod: PBBFAC,25,TH | Performed by: OBSTETRICS & GYNECOLOGY

## 2020-10-23 PROCEDURE — 59025 PR FETAL 2N-STRESS TEST: ICD-10-PCS | Mod: 26,,, | Performed by: OBSTETRICS & GYNECOLOGY

## 2020-10-23 PROCEDURE — 90472 IMMUNIZATION ADMIN EACH ADD: CPT | Mod: PBBFAC

## 2020-10-23 PROCEDURE — 99999 PR PBB SHADOW E&M-EST. PATIENT-LVL II: CPT | Mod: PBBFAC,,, | Performed by: OBSTETRICS & GYNECOLOGY

## 2020-10-23 PROCEDURE — 99999 PR PBB SHADOW E&M-EST. PATIENT-LVL II: ICD-10-PCS | Mod: PBBFAC,,, | Performed by: OBSTETRICS & GYNECOLOGY

## 2020-10-23 PROCEDURE — 99213 OFFICE O/P EST LOW 20 MIN: CPT | Mod: TH,S$PBB,25, | Performed by: OBSTETRICS & GYNECOLOGY

## 2020-10-23 PROCEDURE — 90471 IMMUNIZATION ADMIN: CPT | Mod: PBBFAC

## 2020-10-23 PROCEDURE — 59025 FETAL NON-STRESS TEST: CPT | Mod: 26,,, | Performed by: OBSTETRICS & GYNECOLOGY

## 2020-10-23 PROCEDURE — 76815 OB US LIMITED FETUS(S): CPT | Mod: 26,,, | Performed by: OBSTETRICS & GYNECOLOGY

## 2020-10-23 PROCEDURE — 59025 FETAL NON-STRESS TEST: CPT

## 2020-10-23 PROCEDURE — 99213 PR OFFICE/OUTPT VISIT, EST, LEVL III, 20-29 MIN: ICD-10-PCS | Mod: TH,S$PBB,25, | Performed by: OBSTETRICS & GYNECOLOGY

## 2020-10-23 PROCEDURE — 76815 OB US LIMITED FETUS(S): CPT

## 2020-10-23 PROCEDURE — 76815 PR  US,PREGNANT UTERUS,LIMITED, 1/> FETUSES: ICD-10-PCS | Mod: 26,,, | Performed by: OBSTETRICS & GYNECOLOGY

## 2020-10-23 PROCEDURE — 90686 IIV4 VACC NO PRSV 0.5 ML IM: CPT | Mod: PBBFAC

## 2020-10-23 NOTE — PROGRESS NOTES
Patient in less pain today. BP is in normal range today.  Patient seen and examined.  No complaints, denies VB/LOF/Ctxns, reports good fetal movement. Precautions for Bleeding/ ROM/ Decreased fetal movement/ Pre-E reviewed.  Induction timing and scheduling discussed.  F/U scheduled 1 weeks

## 2020-10-23 NOTE — PROGRESS NOTES
Here for TDAP injection. Patient without complaint of pain at this time, Injection given. Tolerated well. No pain noted after injection.  Advised to wait in lobby 15 minutes and report any adverse reactions.     Site: [facility]    Baby Friendly Handout Given to Patient        Here for Influenza - Quadrivalent - PF (ADULT) vaccine . Vaccine Information sheet given to patient. Patient without complaint of pain prior to or after injection. Immunization tolerated well and patient advised to wait in lobby 15 minutes. Report any adverse reactions.    Site: [facility]

## 2020-10-26 ENCOUNTER — NURSE TRIAGE (OUTPATIENT)
Dept: ADMINISTRATIVE | Facility: CLINIC | Age: 25
End: 2020-10-26

## 2020-10-26 ENCOUNTER — HOSPITAL ENCOUNTER (EMERGENCY)
Facility: OTHER | Age: 25
Discharge: HOME | End: 2020-10-26
Attending: OBSTETRICS & GYNECOLOGY
Payer: MEDICAID

## 2020-10-26 ENCOUNTER — TELEPHONE (OUTPATIENT)
Dept: OBSTETRICS AND GYNECOLOGY | Facility: CLINIC | Age: 25
End: 2020-10-26

## 2020-10-26 VITALS
SYSTOLIC BLOOD PRESSURE: 130 MMHG | HEART RATE: 114 BPM | RESPIRATION RATE: 18 BRPM | DIASTOLIC BLOOD PRESSURE: 86 MMHG | OXYGEN SATURATION: 99 %

## 2020-10-26 DIAGNOSIS — R55 SYNCOPE: ICD-10-CM

## 2020-10-26 DIAGNOSIS — R55 SYNCOPE, UNSPECIFIED SYNCOPE TYPE: Primary | ICD-10-CM

## 2020-10-26 DIAGNOSIS — O30.043 DICHORIONIC DIAMNIOTIC TWIN PREGNANCY IN THIRD TRIMESTER: ICD-10-CM

## 2020-10-26 DIAGNOSIS — Z3A.35 35 WEEKS GESTATION OF PREGNANCY: ICD-10-CM

## 2020-10-26 LAB
ALBUMIN SERPL BCP-MCNC: 2.6 G/DL (ref 3.5–5.2)
ALP SERPL-CCNC: 194 U/L (ref 55–135)
ALT SERPL W/O P-5'-P-CCNC: 8 U/L (ref 10–44)
ANION GAP SERPL CALC-SCNC: 11 MMOL/L (ref 8–16)
AST SERPL-CCNC: 10 U/L (ref 10–40)
BASOPHILS # BLD AUTO: 0.03 K/UL (ref 0–0.2)
BASOPHILS NFR BLD: 0.3 % (ref 0–1.9)
BILIRUB SERPL-MCNC: 0.4 MG/DL (ref 0.1–1)
BILIRUB SERPL-MCNC: NEGATIVE MG/DL
BLOOD URINE, POC: NEGATIVE
BUN SERPL-MCNC: 4 MG/DL (ref 6–20)
CALCIUM SERPL-MCNC: 8.9 MG/DL (ref 8.7–10.5)
CHLORIDE SERPL-SCNC: 107 MMOL/L (ref 95–110)
CO2 SERPL-SCNC: 20 MMOL/L (ref 23–29)
COLOR, POC UA: NORMAL
CREAT SERPL-MCNC: 0.6 MG/DL (ref 0.5–1.4)
DIFFERENTIAL METHOD: ABNORMAL
EOSINOPHIL # BLD AUTO: 0.3 K/UL (ref 0–0.5)
EOSINOPHIL NFR BLD: 2.8 % (ref 0–8)
ERYTHROCYTE [DISTWIDTH] IN BLOOD BY AUTOMATED COUNT: 13.2 % (ref 11.5–14.5)
EST. GFR  (AFRICAN AMERICAN): >60 ML/MIN/1.73 M^2
EST. GFR  (NON AFRICAN AMERICAN): >60 ML/MIN/1.73 M^2
GLUCOSE SERPL-MCNC: 93 MG/DL (ref 70–110)
GLUCOSE UR QL STRIP: NORMAL
HCT VFR BLD AUTO: 35.6 % (ref 37–48.5)
HGB BLD-MCNC: 11.7 G/DL (ref 12–16)
IMM GRANULOCYTES # BLD AUTO: 0.04 K/UL (ref 0–0.04)
IMM GRANULOCYTES NFR BLD AUTO: 0.5 % (ref 0–0.5)
KETONES UR QL STRIP: NEGATIVE
LEUKOCYTE ESTERASE URINE, POC: NORMAL
LYMPHOCYTES # BLD AUTO: 1.6 K/UL (ref 1–4.8)
LYMPHOCYTES NFR BLD: 18.2 % (ref 18–48)
MCH RBC QN AUTO: 27.7 PG (ref 27–31)
MCHC RBC AUTO-ENTMCNC: 32.9 G/DL (ref 32–36)
MCV RBC AUTO: 84 FL (ref 82–98)
MONOCYTES # BLD AUTO: 1 K/UL (ref 0.3–1)
MONOCYTES NFR BLD: 11.6 % (ref 4–15)
NEUTROPHILS # BLD AUTO: 5.9 K/UL (ref 1.8–7.7)
NEUTROPHILS NFR BLD: 66.6 % (ref 38–73)
NITRITE, POC UA: NEGATIVE
NRBC BLD-RTO: 0 /100 WBC
PH, POC UA: 6
PLATELET # BLD AUTO: 284 K/UL (ref 150–350)
PMV BLD AUTO: 9.3 FL (ref 9.2–12.9)
POCT GLUCOSE: 95 MG/DL (ref 70–110)
POTASSIUM SERPL-SCNC: 3.7 MMOL/L (ref 3.5–5.1)
PROT SERPL-MCNC: 7.1 G/DL (ref 6–8.4)
PROTEIN, POC: NORMAL
RBC # BLD AUTO: 4.22 M/UL (ref 4–5.4)
SODIUM SERPL-SCNC: 138 MMOL/L (ref 136–145)
SPECIFIC GRAVITY, POC UA: 1.01
UROBILINOGEN, POC UA: NORMAL
WBC # BLD AUTO: 8.81 K/UL (ref 3.9–12.7)

## 2020-10-26 PROCEDURE — 85025 COMPLETE CBC W/AUTO DIFF WBC: CPT

## 2020-10-26 PROCEDURE — 80053 COMPREHEN METABOLIC PANEL: CPT

## 2020-10-26 PROCEDURE — 59025 FETAL NON-STRESS TEST: CPT | Mod: 26,,, | Performed by: OBSTETRICS & GYNECOLOGY

## 2020-10-26 PROCEDURE — 93010 EKG 12-LEAD: ICD-10-PCS | Mod: ,,, | Performed by: INTERNAL MEDICINE

## 2020-10-26 PROCEDURE — 93010 ELECTROCARDIOGRAM REPORT: CPT | Mod: ,,, | Performed by: INTERNAL MEDICINE

## 2020-10-26 PROCEDURE — 99284 EMERGENCY DEPT VISIT MOD MDM: CPT | Mod: 25,,, | Performed by: OBSTETRICS & GYNECOLOGY

## 2020-10-26 PROCEDURE — 99284 EMERGENCY DEPT VISIT MOD MDM: CPT | Mod: 25

## 2020-10-26 PROCEDURE — 93005 ELECTROCARDIOGRAM TRACING: CPT | Mod: 59

## 2020-10-26 PROCEDURE — 59025 FETAL NON-STRESS TEST: CPT

## 2020-10-26 PROCEDURE — 99284 PR EMERGENCY DEPT VISIT,LEVEL IV: ICD-10-PCS | Mod: 25,,, | Performed by: OBSTETRICS & GYNECOLOGY

## 2020-10-26 PROCEDURE — 59025 PR FETAL 2N-STRESS TEST: ICD-10-PCS | Mod: 26,,, | Performed by: OBSTETRICS & GYNECOLOGY

## 2020-10-26 NOTE — TELEPHONE ENCOUNTER
Pt states she was in kitchen started to feel hot, went to sit down on sofa, started to feel dizzy and then next thing she remembers she woke up to her toddler calling her name. Toddler told pt that she went to sleep. 36 weeks pregnant, pt currently lying in bed. Admits continued lightheadedness. Pt is currently with  and declines calling 911 but agrees she will have  bring her to OB ED at [facility].     Reason for Disposition   Still feels dizzy or lightheaded    Additional Information   Negative: Still unconscious    Protocols used: [id number]-Q-DC

## 2020-10-26 NOTE — ED PROVIDER NOTES
Encounter Date: 10/26/2020       History     Chief Complaint   Patient presents with    Loss of Consciousness     got dizzy and passed out on sofa at home       Liliya Gaston is a 25 y.o. [id number] at 35w6d presents complaining of a syncopal episode while cooking. The patient felt light headed then went to sit down and had loss of consciousness on the couch. She felt hot and flushed before passing out. She did not fall or hit any part of body. Denies any chest pain, palpitations, SOB, incontinence, or HA.  She had not eaten for 10 hours before the episode occurred.   This IUP is complicated by di-di twin pregnancy, trichomonas in 1T (neg STARLA), GBS+, and h/o LTCS .  Patient denies contractions, denies vaginal bleeding, denies LOF.   Fetal Movement: normal.     Review of patient's allergies indicates:  No Known Allergies  Past Medical History:   Diagnosis Date    Anemia     Asthma complicating pregnancy, antepartum 2015     No past surgical history on file.  Family History   Problem Relation Age of Onset    Breast cancer Neg Hx     Colon cancer Neg Hx     Ovarian cancer Neg Hx      Social History     Tobacco Use    Smoking status: Never Smoker    Smokeless tobacco: Never Used   Substance Use Topics    Alcohol use: No    Drug use: No     Review of Systems   Constitutional: Negative for chills and fever.   HENT: Negative for sore throat.    Eyes: Negative for visual disturbance.   Respiratory: Negative for cough and shortness of breath.    Cardiovascular: Negative for chest pain and palpitations.   Gastrointestinal: Negative for abdominal pain, diarrhea, nausea and vomiting.   Genitourinary: Negative for difficulty urinating, flank pain, pelvic pain, vaginal bleeding, vaginal discharge and vaginal pain.   Musculoskeletal: Negative for myalgias.   Skin: Negative for color change.   Neurological: Positive for syncope and weakness. Negative for dizziness, light-headedness and headaches.    Psychiatric/Behavioral: Negative for confusion.   All other systems reviewed and are negative.      Physical Exam     Initial Vitals   BP Pulse Resp Temp SpO2   10/26/20 1716 10/26/20 1716 10/26/20 1849 -- 10/26/20 1849   122/67 105 18  99 %      MAP       --              /86 (BP Location: Left arm, Patient Position: Standing)   Pulse (!) 114   Resp 18   LMP 02/18/2020 (LMP Unknown)   SpO2 99%     Physical Exam    Vitals reviewed.  Constitutional: She appears well-developed and well-nourished. No distress.   HENT:   Head: Normocephalic and atraumatic.   Eyes: EOM are normal.   Cardiovascular: Normal rate, regular rhythm and normal heart sounds.   Pulmonary/Chest: No respiratory distress. She has no wheezes.   Abdominal: Soft. She exhibits no distension. There is no abdominal tenderness. There is no rebound and no guarding.   Genitourinary:    Uterus normal.     Musculoskeletal: Normal range of motion. No tenderness or edema.   Neurological: She is alert and oriented to person, place, and time.   Skin: Skin is warm, dry and intact. No rash noted.   Psychiatric: She has a normal mood and affect. Her speech is normal and behavior is normal.         ED Course   Obtain Fetal nonstress test (NST)    Date/Time: 10/26/2020 7:14 PM  Performed by: Aakash Álvarez MD  Authorized by: Abena Rivers MD     Nonstress Test:     Variability:  6-25 BPM    Decelerations:  None    Accelerations:  15 bpm    Baseline:  145 (150)    Uterine Irritability: No      Contractions:  Irregular  Biophysical Profile:     Nonstress Test Interpretation: reactive      Overall Impression:  Reassuring      Labs Reviewed   CBC W/ AUTO DIFFERENTIAL - Abnormal; Notable for the following components:       Result Value    Hemoglobin 11.7 (*)     Hematocrit 35.6 (*)     All other components within normal limits   COMPREHENSIVE METABOLIC PANEL - Abnormal; Notable for the following components:    CO2 20 (*)     BUN 4 (*)     Albumin 2.6 (*)      Alkaline Phosphatase 194 (*)     ALT 8 (*)     All other components within normal limits   POCT URINALYSIS, DIPSTICK OR TABLET REAGENT, AUTOMATED, WITH MICROSCOP   POCT GLUCOSE          Imaging Results    None          Medical Decision Making:   ED Management:  VSS  EKG Sinus tach; otherwise wnl  U dip wnl  NST reactive, reassuring w/ irreg contractions  Orthostatics wnl  POCT glucose 90s  CBC, CMP wnl  Episode likely 2/2 hypoglycemia  Pt counseled on eating small snacks regularly and precautions given repeat sx                Attending Attestation:   Physician Attestation Statement for Resident:  As the supervising MD   Physician Attestation Statement: I have personally seen and examined this patient.   I agree with the above history. -:   As the supervising MD I agree with the above PE.    As the supervising MD I agree with the above treatment, course, plan, and disposition.   -: Patient evaluated and found to be stable, agree with resident's assessment and plan.  I was personally present during the critical portions of the procedure(s) performed by the resident and was immediately available in the ED to provide services and assistance as needed during the entire procedure.  I have reviewed and agree with the residents interpretation of the following: lab data.  I have reviewed the following: old records at this facility.                              Clinical Impression:     ICD-10-CM ICD-9-CM   1. Syncope, unspecified syncope type  R55 780.2   2. Syncope  R55 780.2   3. 35 weeks gestation of pregnancy  Z3A.35 V22.2   4. Dichorionic diamniotic twin pregnancy in third trimester  O30.043 651.03     V91.03                      Disposition:   Disposition: Discharged  Condition: Stable     ED Disposition Condition    Discharge Stable        ED Prescriptions     None        Follow-up Information    None                   MANJU Álvarez MD  OBGYN PGY2                    Aakash Álvarez MD  Resident  10/26/20 1920        Keeley Francisco MD  10/27/20 6067

## 2020-10-26 NOTE — TELEPHONE ENCOUNTER
----- Message from Mell Edmnod sent at 10/26/2020  3:35 PM CDT -----  Contact: TYLOR BARRY HERNANDEZ [[id number]]  Type: Patient Call Back    Who called:BARRY SNIDER [[id number]]    What is the request in detail: Patient is requesting a call back. EMILY SNIDERLJHIMANSHU HERNANDEZ [[id number]]on the line stating she needs to speak with someone in the office of dr. arenas right away she requested to speak with you. She states she doesn;t know if she passed out or not she sounds a little strange and out of it.  Please advise.    Can the clinic reply by [facility]? No    Best call back number: ..[phone]    Additional Information: N/A      Returned patient call, patient states she think she blacked out. Patient states she was in the kitchen and started getting hot. Patient then went to sit on the sofa and she started getting dizzy. Patient states she don't remember anything after that. Patient states she woke up to her son tapping her and calling her name. Inform patient she need to come into L&D to be checked out and will inform Dr Arenas.

## 2020-10-27 NOTE — DISCHARGE INSTRUCTIONS
Call the OB/GYN clinic at  [phone] or the L&D after hours number at [phone] for:  [] any sign of vaginal bleeding (soaking one or more pad within one hour)   [] any leakage (or gush) of fluids  [] regular contractions that occur every 5 minutes for 2 hours   [] decreased fetal movements (less than 10 kicks in 2 hours)  [] headache that is not relieved by taking Tylenol (acetaminophen), any vision changes or blurry vision, pain occurring under the right breast, and/or a temperature of 100.4 or greater.   [] Begin doing fetal kick counts at 28 weeks gestation. During the kick count you should feel at least 10 movements in 2 hours.  Please keep your next clinic appointment.    Dizziness or Fainting During Pregnancy  Feeling dizzy or faint is very common during pregnancy. It generally does not mean something is wrong. It is most common during the first trimester, but it can happen anytime during pregnancy. Dizziness and fainting (syncope) are often caused by a drop in blood pressure. This is from the hormones released during pregnancy that relax the bodys blood vessels. Too little blood is then pumped up to the brain. When this happens, you lose consciousness (faint). Fainting is not dangerous to you or your baby unless you fall and hurt yourself.     If you must stand for long periods, compression stockings can help keep your blood moving.   Home care  To help prevent dizziness and fainting:  · When you get up from sitting or lying down, do so slowly. Clench and release your leg muscles before and during standing.  · Avoid standing for too long. If you must stand for prolonged periods of time, wear compression stockings. These are special stockings that help keep blood flowing toward the heart. Most medical supply stores sell these stockings.  · Avoid long breaks between meals. Keep snacks handy in case you get hungry.  · Avoid hot showers or baths. Use warm water. Be careful to stand up from a bath  slowly.  · After the first trimester, avoid lying on your back.  · Try to eat every few hours. Instead of eating 3 larger meals a day, try eating 6 smaller meals. Snack on healthy foods that contain some protein like nuts and peanut butter.  · If you have been told you are anemic, eating iron-rich foods may be helpful.  If you feel dizzy:  · Sit or lie down. If you sit, put your head between your knees. If you lie down, try to get your feet higher than your head.  · Take deep breaths. Breathe out slowly.  · Move toward fresh or circulating air.  · Loosen tight clothing.  · Do not eat or drink anything while you feel dizzy  Follow-up care  Follow up with your healthcare provider, or as advised. Arrange for prenatal care if you havent already. Go to all your prenatal appointments. Get prenatal tests as instructed.  When to seek medical advice  Call your healthcare provider right away if you have any of the following symptoms. These may mean a more severe cause for dizziness or fainting:  · Vaginal bleeding  · Pain in your abdomen  · Less movement of the baby than usual  · Unusually pale skin  · Dizziness or fainting with blurred vision, headaches, confusion, palpitations, or fast heartbeat  · You have fainted and hurt yourself or fallen hard on your abdomen  Date Last Reviewed: 6/1/2016  © 6415-6464 [employer]. [address] [place] [address], [place] [address]. All rights reserved. This information is not intended as a substitute for professional medical care. Always follow your healthcare professional's instructions.

## 2020-10-30 ENCOUNTER — HOSPITAL ENCOUNTER (OUTPATIENT)
Dept: PERINATAL CARE | Facility: OTHER | Age: 25
Discharge: HOME | End: 2020-10-30
Attending: OBSTETRICS & GYNECOLOGY
Payer: MEDICAID

## 2020-10-30 ENCOUNTER — ROUTINE PRENATAL (OUTPATIENT)
Dept: OBSTETRICS AND GYNECOLOGY | Facility: CLINIC | Age: 25
End: 2020-10-30
Attending: OBSTETRICS & GYNECOLOGY
Payer: MEDICAID

## 2020-10-30 VITALS — DIASTOLIC BLOOD PRESSURE: 84 MMHG | SYSTOLIC BLOOD PRESSURE: 132 MMHG | BODY MASS INDEX: 57.65 KG/M2 | WEIGHT: 293 LBS

## 2020-10-30 DIAGNOSIS — O99.519 ASTHMA COMPLICATING PREGNANCY, ANTEPARTUM: Primary | ICD-10-CM

## 2020-10-30 DIAGNOSIS — O30.043 DICHORIONIC DIAMNIOTIC TWIN PREGNANCY IN THIRD TRIMESTER: ICD-10-CM

## 2020-10-30 DIAGNOSIS — O99.213 OBESITY COMPLICATING PREGNANCY, THIRD TRIMESTER: ICD-10-CM

## 2020-10-30 DIAGNOSIS — J45.909 ASTHMA COMPLICATING PREGNANCY, ANTEPARTUM: Primary | ICD-10-CM

## 2020-10-30 PROCEDURE — 59025 FETAL NON-STRESS TEST: CPT | Mod: 26,,, | Performed by: OBSTETRICS & GYNECOLOGY

## 2020-10-30 PROCEDURE — 99999 PR PBB SHADOW E&M-EST. PATIENT-LVL II: ICD-10-PCS | Mod: PBBFAC,,, | Performed by: OBSTETRICS & GYNECOLOGY

## 2020-10-30 PROCEDURE — 76815 OB US LIMITED FETUS(S): CPT | Mod: 26,,, | Performed by: OBSTETRICS & GYNECOLOGY

## 2020-10-30 PROCEDURE — 99213 OFFICE O/P EST LOW 20 MIN: CPT | Mod: TH,S$PBB,25, | Performed by: OBSTETRICS & GYNECOLOGY

## 2020-10-30 PROCEDURE — 76815 PR  US,PREGNANT UTERUS,LIMITED, 1/> FETUSES: ICD-10-PCS | Mod: 26,,, | Performed by: OBSTETRICS & GYNECOLOGY

## 2020-10-30 PROCEDURE — 59025 FETAL NON-STRESS TEST: CPT

## 2020-10-30 PROCEDURE — 76815 OB US LIMITED FETUS(S): CPT

## 2020-10-30 PROCEDURE — 99213 PR OFFICE/OUTPT VISIT, EST, LEVL III, 20-29 MIN: ICD-10-PCS | Mod: TH,S$PBB,25, | Performed by: OBSTETRICS & GYNECOLOGY

## 2020-10-30 PROCEDURE — 99999 PR PBB SHADOW E&M-EST. PATIENT-LVL II: CPT | Mod: PBBFAC,,, | Performed by: OBSTETRICS & GYNECOLOGY

## 2020-10-30 PROCEDURE — 59025 PR FETAL 2N-STRESS TEST: ICD-10-PCS | Mod: 26,,, | Performed by: OBSTETRICS & GYNECOLOGY

## 2020-10-30 PROCEDURE — 99212 OFFICE O/P EST SF 10 MIN: CPT | Mod: PBBFAC,25,TH | Performed by: OBSTETRICS & GYNECOLOGY

## 2020-10-30 RX ORDER — MONTELUKAST SODIUM 10 MG/1
10 TABLET, FILM COATED ORAL NIGHTLY
Qty: 30 TABLET | Refills: 4 | Status: SHIPPED | OUTPATIENT
Start: 2020-10-30

## 2020-10-30 RX ORDER — ALBUTEROL SULFATE 108 UG/1
1-2 AEROSOL, METERED RESPIRATORY (INHALATION) EVERY 4 HOURS PRN
Qty: 18 G | Refills: 2 | Status: SHIPPED | OUTPATIENT
Start: 2020-10-30

## 2020-10-30 NOTE — PROGRESS NOTES
NST reactive x 2  Normal MVP x 2    Please see imaging tab for full prenatal testing Viewpoint report.        Frankie Hanson MD   Maternal-Fetal Medicine

## 2020-11-05 ENCOUNTER — PROCEDURE VISIT (OUTPATIENT)
Dept: MATERNAL FETAL MEDICINE | Facility: CLINIC | Age: 25
End: 2020-11-05
Payer: MEDICAID

## 2020-11-05 ENCOUNTER — ROUTINE PRENATAL (OUTPATIENT)
Dept: OBSTETRICS AND GYNECOLOGY | Facility: CLINIC | Age: 25
End: 2020-11-05
Attending: OBSTETRICS & GYNECOLOGY
Payer: MEDICAID

## 2020-11-05 ENCOUNTER — TELEPHONE (OUTPATIENT)
Dept: OBSTETRICS AND GYNECOLOGY | Facility: CLINIC | Age: 25
End: 2020-11-05

## 2020-11-05 VITALS — DIASTOLIC BLOOD PRESSURE: 74 MMHG | WEIGHT: 293 LBS | BODY MASS INDEX: 57.49 KG/M2 | SYSTOLIC BLOOD PRESSURE: 120 MMHG

## 2020-11-05 DIAGNOSIS — O30.043 DICHORIONIC DIAMNIOTIC TWIN PREGNANCY IN THIRD TRIMESTER: ICD-10-CM

## 2020-11-05 DIAGNOSIS — Z34.90 PREGNANT: ICD-10-CM

## 2020-11-05 DIAGNOSIS — Z36.89 ENCOUNTER FOR ULTRASOUND TO ASSESS FETAL GROWTH: ICD-10-CM

## 2020-11-05 DIAGNOSIS — Z34.90 TERM PREGNANCY: Primary | ICD-10-CM

## 2020-11-05 PROCEDURE — 76819 PR US, OB, FETAL BIOPHYSICAL, W/O NST: ICD-10-PCS | Mod: 26,59,S$PBB, | Performed by: OBSTETRICS & GYNECOLOGY

## 2020-11-05 PROCEDURE — 99213 PR OFFICE/OUTPT VISIT, EST, LEVL III, 20-29 MIN: ICD-10-PCS | Mod: TH,S$PBB,, | Performed by: OBSTETRICS & GYNECOLOGY

## 2020-11-05 PROCEDURE — 99213 OFFICE O/P EST LOW 20 MIN: CPT | Mod: TH,S$PBB,, | Performed by: OBSTETRICS & GYNECOLOGY

## 2020-11-05 PROCEDURE — 99212 OFFICE O/P EST SF 10 MIN: CPT | Mod: PBBFAC,25,TH | Performed by: OBSTETRICS & GYNECOLOGY

## 2020-11-05 PROCEDURE — 76816 OB US FOLLOW-UP PER FETUS: CPT | Mod: 59,PBBFAC | Performed by: OBSTETRICS & GYNECOLOGY

## 2020-11-05 PROCEDURE — 99999 PR PBB SHADOW E&M-EST. PATIENT-LVL II: CPT | Mod: PBBFAC,,, | Performed by: OBSTETRICS & GYNECOLOGY

## 2020-11-05 PROCEDURE — 99999 PR PBB SHADOW E&M-EST. PATIENT-LVL II: ICD-10-PCS | Mod: PBBFAC,,, | Performed by: OBSTETRICS & GYNECOLOGY

## 2020-11-05 PROCEDURE — 76816 OB US FOLLOW-UP PER FETUS: CPT | Mod: 26,S$PBB,, | Performed by: OBSTETRICS & GYNECOLOGY

## 2020-11-05 PROCEDURE — 76819 FETAL BIOPHYS PROFIL W/O NST: CPT | Mod: 26,S$PBB,, | Performed by: OBSTETRICS & GYNECOLOGY

## 2020-11-05 PROCEDURE — 76819 FETAL BIOPHYS PROFIL W/O NST: CPT | Mod: PBBFAC | Performed by: OBSTETRICS & GYNECOLOGY

## 2020-11-05 PROCEDURE — 76816 PR  US,PREGNANT UTERUS,F/U,TRANSABD APP: ICD-10-PCS | Mod: 26,59,S$PBB, | Performed by: OBSTETRICS & GYNECOLOGY

## 2020-11-05 NOTE — TELEPHONE ENCOUNTER
----- Message from Suyapa Sandro sent at 11/5/2020  2:12 PM CST -----  Regarding: contractions  Name of Who is Calling: BARRY SNIDER [[id number]] What is the request in detail: Patient is requesting a call back because she's having contractions and would like to speak with you. Can the clinic reply by [facility]: No What [facility] [phone]      Return patient call, patient states she was cramping a little today. Inform patient she can be cramping from being checked today and inform patient to drink water and monitor the cramping. Patient verbalized and understand

## 2020-11-09 ENCOUNTER — TELEPHONE (OUTPATIENT)
Dept: OBSTETRICS AND GYNECOLOGY | Facility: CLINIC | Age: 25
End: 2020-11-09

## 2020-11-09 ENCOUNTER — HOSPITAL ENCOUNTER (OUTPATIENT)
Dept: PREADMISSION TESTING | Facility: OTHER | Age: 25
Discharge: HOME | End: 2020-11-09
Attending: OBSTETRICS & GYNECOLOGY
Payer: MEDICAID

## 2020-11-09 DIAGNOSIS — Z34.90 PREGNANCY: ICD-10-CM

## 2020-11-09 LAB — SARS-COV-2 RDRP RESP QL NAA+PROBE: NEGATIVE

## 2020-11-09 PROCEDURE — U0002 COVID-19 LAB TEST NON-CDC: HCPCS

## 2020-11-09 NOTE — TELEPHONE ENCOUNTER
----- Message from Kathy Degroot sent at 11/9/2020  8:45 AM CST -----  Regarding: Regarding last message  Who called:BARRY SNIDER [[id number]]    What is the request in detail: Patient is requesting a call back. regarding last message, best call back number is one on file   Please advise.    Can the clinic reply by [facility]? No    Best call back number: [phone]    Additional Information: N/A        Returned patient call, patient states she called and spoke with someone on Saturday and was told she can get her covid test done when she arrive for her induction. Inform patient I spoke with pre-admit and was told patient can come in today for testing. Patient verbalized and understand

## 2020-11-10 ENCOUNTER — ANESTHESIA EVENT (OUTPATIENT)
Dept: OBSTETRICS AND GYNECOLOGY | Facility: OTHER | Age: 25
End: 2020-11-10
Payer: MEDICAID

## 2020-11-10 ENCOUNTER — ANESTHESIA (OUTPATIENT)
Dept: OBSTETRICS AND GYNECOLOGY | Facility: OTHER | Age: 25
End: 2020-11-10
Payer: MEDICAID

## 2020-11-10 ENCOUNTER — HOSPITAL ENCOUNTER (INPATIENT)
Facility: OTHER | Age: 25
LOS: 4 days | Discharge: HOME | End: 2020-11-14
Attending: OBSTETRICS & GYNECOLOGY | Admitting: OBSTETRICS & GYNECOLOGY
Payer: MEDICAID

## 2020-11-10 DIAGNOSIS — Z98.891 STATUS POST CESAREAN DELIVERY: Primary | ICD-10-CM

## 2020-11-10 DIAGNOSIS — O34.219 HISTORY OF CESAREAN DELIVERY AFFECTING PREGNANCY: ICD-10-CM

## 2020-11-10 DIAGNOSIS — O30.043 DICHORIONIC DIAMNIOTIC TWIN PREGNANCY IN THIRD TRIMESTER: ICD-10-CM

## 2020-11-10 LAB
ABO + RH BLD: NORMAL
BASOPHILS # BLD AUTO: 0.02 K/UL (ref 0–0.2)
BASOPHILS NFR BLD: 0.3 % (ref 0–1.9)
BLD GP AB SCN CELLS X3 SERPL QL: NORMAL
DIFFERENTIAL METHOD: ABNORMAL
EOSINOPHIL # BLD AUTO: 0.2 K/UL (ref 0–0.5)
EOSINOPHIL NFR BLD: 2.2 % (ref 0–8)
ERYTHROCYTE [DISTWIDTH] IN BLOOD BY AUTOMATED COUNT: 13.5 % (ref 11.5–14.5)
HCT VFR BLD AUTO: 36.6 % (ref 37–48.5)
HGB BLD-MCNC: 11.9 G/DL (ref 12–16)
IMM GRANULOCYTES # BLD AUTO: 0.03 K/UL (ref 0–0.04)
IMM GRANULOCYTES NFR BLD AUTO: 0.4 % (ref 0–0.5)
LYMPHOCYTES # BLD AUTO: 1.8 K/UL (ref 1–4.8)
LYMPHOCYTES NFR BLD: 23.3 % (ref 18–48)
MCH RBC QN AUTO: 27.2 PG (ref 27–31)
MCHC RBC AUTO-ENTMCNC: 32.5 G/DL (ref 32–36)
MCV RBC AUTO: 84 FL (ref 82–98)
MONOCYTES # BLD AUTO: 1 K/UL (ref 0.3–1)
MONOCYTES NFR BLD: 12.1 % (ref 4–15)
NEUTROPHILS # BLD AUTO: 4.8 K/UL (ref 1.8–7.7)
NEUTROPHILS NFR BLD: 61.7 % (ref 38–73)
NRBC BLD-RTO: 0 /100 WBC
PLATELET # BLD AUTO: 306 K/UL (ref 150–350)
PMV BLD AUTO: 9.4 FL (ref 9.2–12.9)
RBC # BLD AUTO: 4.38 M/UL (ref 4–5.4)
WBC # BLD AUTO: 7.82 K/UL (ref 3.9–12.7)

## 2020-11-10 PROCEDURE — 63600175 PHARM REV CODE 636 W HCPCS: Performed by: STUDENT IN AN ORGANIZED HEALTH CARE EDUCATION/TRAINING PROGRAM

## 2020-11-10 PROCEDURE — 72100002 HC LABOR CARE, 1ST 8 HOURS

## 2020-11-10 PROCEDURE — 85025 COMPLETE CBC W/AUTO DIFF WBC: CPT

## 2020-11-10 PROCEDURE — 11000001 HC ACUTE MED/SURG PRIVATE ROOM

## 2020-11-10 PROCEDURE — 25000003 PHARM REV CODE 250: Performed by: STUDENT IN AN ORGANIZED HEALTH CARE EDUCATION/TRAINING PROGRAM

## 2020-11-10 PROCEDURE — 59200 INSERT CERVICAL DILATOR: CPT

## 2020-11-10 PROCEDURE — 86901 BLOOD TYPING SEROLOGIC RH(D): CPT

## 2020-11-10 RX ORDER — SODIUM CHLORIDE, SODIUM LACTATE, POTASSIUM CHLORIDE, CALCIUM CHLORIDE 600; 310; 30; 20 MG/100ML; MG/100ML; MG/100ML; MG/100ML
INJECTION, SOLUTION INTRAVENOUS CONTINUOUS
Status: DISCONTINUED | OUTPATIENT
Start: 2020-11-10 | End: 2020-11-11

## 2020-11-10 RX ORDER — OXYTOCIN/RINGER'S LACTATE 30/500 ML
95 PLASTIC BAG, INJECTION (ML) INTRAVENOUS ONCE
Status: COMPLETED | OUTPATIENT
Start: 2020-11-10 | End: 2020-11-11

## 2020-11-10 RX ORDER — OXYTOCIN/RINGER'S LACTATE 30/500 ML
2 PLASTIC BAG, INJECTION (ML) INTRAVENOUS CONTINUOUS
Status: DISCONTINUED | OUTPATIENT
Start: 2020-11-10 | End: 2020-11-11

## 2020-11-10 RX ORDER — CEFAZOLIN SODIUM 2 G/50ML
2 SOLUTION INTRAVENOUS ONCE AS NEEDED
Status: DISCONTINUED | OUTPATIENT
Start: 2020-11-10 | End: 2020-11-11

## 2020-11-10 RX ORDER — SIMETHICONE 80 MG
1 TABLET,CHEWABLE ORAL 4 TIMES DAILY PRN
Status: DISCONTINUED | OUTPATIENT
Start: 2020-11-10 | End: 2020-11-11

## 2020-11-10 RX ORDER — CALCIUM CARBONATE 200(500)MG
500 TABLET,CHEWABLE ORAL 3 TIMES DAILY PRN
Status: DISCONTINUED | OUTPATIENT
Start: 2020-11-10 | End: 2020-11-11

## 2020-11-10 RX ORDER — ONDANSETRON 8 MG/1
8 TABLET, ORALLY DISINTEGRATING ORAL EVERY 8 HOURS PRN
Status: DISCONTINUED | OUTPATIENT
Start: 2020-11-10 | End: 2020-11-11

## 2020-11-10 RX ORDER — OXYTOCIN/RINGER'S LACTATE 30/500 ML
334 PLASTIC BAG, INJECTION (ML) INTRAVENOUS ONCE
Status: DISCONTINUED | OUTPATIENT
Start: 2020-11-10 | End: 2020-11-11

## 2020-11-10 RX ORDER — SODIUM CHLORIDE 9 MG/ML
INJECTION, SOLUTION INTRAVENOUS
Status: DISCONTINUED | OUTPATIENT
Start: 2020-11-10 | End: 2020-11-11

## 2020-11-10 RX ADMIN — DEXTROSE 5 MILLION UNITS: 50 INJECTION, SOLUTION INTRAVENOUS at 08:11

## 2020-11-10 RX ADMIN — SODIUM CHLORIDE, SODIUM LACTATE, POTASSIUM CHLORIDE, AND CALCIUM CHLORIDE: .6; .31; .03; .02 INJECTION, SOLUTION INTRAVENOUS at 08:11

## 2020-11-10 RX ADMIN — Medication 2 MILLI-UNITS/MIN: at 08:11

## 2020-11-11 PROBLEM — Z98.891 STATUS POST CESAREAN DELIVERY: Status: ACTIVE | Noted: 2020-10-13

## 2020-11-11 LAB
ALBUMIN SERPL BCP-MCNC: 2.7 G/DL (ref 3.5–5.2)
ALLENS TEST: ABNORMAL
ALP SERPL-CCNC: 233 U/L (ref 55–135)
ALT SERPL W/O P-5'-P-CCNC: 11 U/L (ref 10–44)
ANION GAP SERPL CALC-SCNC: 13 MMOL/L (ref 8–16)
AST SERPL-CCNC: 14 U/L (ref 10–40)
BILIRUB SERPL-MCNC: 0.8 MG/DL (ref 0.1–1)
BUN SERPL-MCNC: 8 MG/DL (ref 6–20)
CALCIUM SERPL-MCNC: 9 MG/DL (ref 8.7–10.5)
CHLORIDE SERPL-SCNC: 104 MMOL/L (ref 95–110)
CO2 SERPL-SCNC: 19 MMOL/L (ref 23–29)
CREAT SERPL-MCNC: 0.9 MG/DL (ref 0.5–1.4)
CREAT UR-MCNC: 107.5 MG/DL (ref 15–325)
DELSYS: ABNORMAL
EST. GFR  (AFRICAN AMERICAN): >60 ML/MIN/1.73 M^2
EST. GFR  (NON AFRICAN AMERICAN): >60 ML/MIN/1.73 M^2
GLUCOSE SERPL-MCNC: 88 MG/DL (ref 70–110)
HCO3 UR-SCNC: 20.3 MMOL/L (ref 24–28)
HCO3 UR-SCNC: 20.7 MMOL/L (ref 24–28)
HCO3 UR-SCNC: 21.6 MMOL/L (ref 24–28)
HCO3 UR-SCNC: 23.2 MMOL/L (ref 24–28)
HCT VFR BLD CALC: 43 %PCV (ref 36–54)
HCT VFR BLD CALC: 44 %PCV (ref 36–54)
HCT VFR BLD CALC: 53 %PCV (ref 36–54)
HCT VFR BLD CALC: 53 %PCV (ref 36–54)
HGB BLD-MCNC: 15 G/DL
HGB BLD-MCNC: 15 G/DL
HGB BLD-MCNC: 18 G/DL
HGB BLD-MCNC: 18 G/DL
PCO2 BLDA: 42.2 MMHG (ref 35–45)
PCO2 BLDA: 46.5 MMHG (ref 35–45)
PCO2 BLDA: 49.2 MMHG (ref 35–45)
PCO2 BLDA: 49.7 MMHG (ref 35–45)
PH SMN: 7.23 [PH] (ref 7.35–7.45)
PH SMN: 7.25 [PH] (ref 7.35–7.45)
PH SMN: 7.28 [PH] (ref 7.35–7.45)
PH SMN: 7.32 [PH] (ref 7.35–7.45)
PO2 BLDA: 14 MMHG (ref 80–100)
PO2 BLDA: 19 MMHG (ref 80–100)
PO2 BLDA: 20 MMHG (ref 80–100)
PO2 BLDA: 21 MMHG (ref 80–100)
POC BE: -4 MMOL/L
POC BE: -5 MMOL/L
POC BE: -7 MMOL/L
POC BE: -7 MMOL/L
POC IONIZED CALCIUM: 1.57 MMOL/L (ref 1.06–1.42)
POC IONIZED CALCIUM: 1.58 MMOL/L (ref 1.06–1.42)
POC IONIZED CALCIUM: 1.6 MMOL/L (ref 1.06–1.42)
POC IONIZED CALCIUM: 1.61 MMOL/L (ref 1.06–1.42)
POC SATURATED O2: 14 % (ref 95–100)
POC SATURATED O2: 24 % (ref 95–100)
POC SATURATED O2: 25 % (ref 95–100)
POC SATURATED O2: 26 % (ref 95–100)
POC TCO2: 22 MMOL/L (ref 23–27)
POC TCO2: 22 MMOL/L (ref 23–27)
POC TCO2: 23 MMOL/L (ref 23–27)
POC TCO2: 25 MMOL/L (ref 23–27)
POTASSIUM BLD-SCNC: 4.1 MMOL/L (ref 3.5–5.1)
POTASSIUM BLD-SCNC: 4.2 MMOL/L (ref 3.5–5.1)
POTASSIUM BLD-SCNC: 5.8 MMOL/L (ref 3.5–5.1)
POTASSIUM BLD-SCNC: 5.9 MMOL/L (ref 3.5–5.1)
POTASSIUM SERPL-SCNC: 3.8 MMOL/L (ref 3.5–5.1)
PROT SERPL-MCNC: 7.3 G/DL (ref 6–8.4)
PROT UR-MCNC: 26 MG/DL (ref 0–15)
PROT/CREAT UR: 0.24 MG/G{CREAT} (ref 0–0.2)
SAMPLE: ABNORMAL
SITE: ABNORMAL
SODIUM BLD-SCNC: 133 MMOL/L (ref 136–145)
SODIUM BLD-SCNC: 133 MMOL/L (ref 136–145)
SODIUM BLD-SCNC: 135 MMOL/L (ref 136–145)
SODIUM BLD-SCNC: 136 MMOL/L (ref 136–145)
SODIUM SERPL-SCNC: 136 MMOL/L (ref 136–145)

## 2020-11-11 PROCEDURE — 36004725 HC OB OR TIME LEV III - EA ADD 15 MIN: Performed by: OBSTETRICS & GYNECOLOGY

## 2020-11-11 PROCEDURE — 63600175 PHARM REV CODE 636 W HCPCS: Performed by: ANESTHESIOLOGY

## 2020-11-11 PROCEDURE — 58611 LIGATE OVIDUCT(S) ADD-ON: CPT | Mod: ,,, | Performed by: OBSTETRICS & GYNECOLOGY

## 2020-11-11 PROCEDURE — C1751 CATH, INF, PER/CENT/MIDLINE: HCPCS | Performed by: ANESTHESIOLOGY

## 2020-11-11 PROCEDURE — 94640 AIRWAY INHALATION TREATMENT: CPT

## 2020-11-11 PROCEDURE — 11000001 HC ACUTE MED/SURG PRIVATE ROOM

## 2020-11-11 PROCEDURE — 80053 COMPREHEN METABOLIC PANEL: CPT

## 2020-11-11 PROCEDURE — 25000003 PHARM REV CODE 250: Performed by: STUDENT IN AN ORGANIZED HEALTH CARE EDUCATION/TRAINING PROGRAM

## 2020-11-11 PROCEDURE — 51702 INSERT TEMP BLADDER CATH: CPT

## 2020-11-11 PROCEDURE — 59620 PR C-SEC ONLY,PREV C-SEC: ICD-10-PCS | Mod: 22,AT,, | Performed by: OBSTETRICS & GYNECOLOGY

## 2020-11-11 PROCEDURE — 94761 N-INVAS EAR/PLS OXIMETRY MLT: CPT

## 2020-11-11 PROCEDURE — 88307 TISSUE EXAM BY PATHOLOGIST: CPT | Performed by: PATHOLOGY

## 2020-11-11 PROCEDURE — 25000242 PHARM REV CODE 250 ALT 637 W/ HCPCS: Performed by: STUDENT IN AN ORGANIZED HEALTH CARE EDUCATION/TRAINING PROGRAM

## 2020-11-11 PROCEDURE — 71000033 HC RECOVERY, INTIAL HOUR: Performed by: OBSTETRICS & GYNECOLOGY

## 2020-11-11 PROCEDURE — 88302 PR  SURG PATH,LEVEL II: ICD-10-PCS | Mod: 26,,, | Performed by: PATHOLOGY

## 2020-11-11 PROCEDURE — 62326 NJX INTERLAMINAR LMBR/SAC: CPT | Performed by: ANESTHESIOLOGY

## 2020-11-11 PROCEDURE — 71000039 HC RECOVERY, EACH ADD'L HOUR: Performed by: OBSTETRICS & GYNECOLOGY

## 2020-11-11 PROCEDURE — 88302 TISSUE EXAM BY PATHOLOGIST: CPT | Mod: 26,,, | Performed by: PATHOLOGY

## 2020-11-11 PROCEDURE — 01968 ANES/ANALG CS DLVR NEURAXIAL: CPT | Mod: AA,,, | Performed by: ANESTHESIOLOGY

## 2020-11-11 PROCEDURE — 88307 TISSUE EXAM BY PATHOLOGIST: CPT | Mod: 26,,, | Performed by: PATHOLOGY

## 2020-11-11 PROCEDURE — 88302 TISSUE EXAM BY PATHOLOGIST: CPT | Performed by: PATHOLOGY

## 2020-11-11 PROCEDURE — 63600175 PHARM REV CODE 636 W HCPCS: Performed by: STUDENT IN AN ORGANIZED HEALTH CARE EDUCATION/TRAINING PROGRAM

## 2020-11-11 PROCEDURE — 36415 COLL VENOUS BLD VENIPUNCTURE: CPT

## 2020-11-11 PROCEDURE — 59514 CESAREAN DELIVERY ONLY: CPT | Mod: AA,,, | Performed by: ANESTHESIOLOGY

## 2020-11-11 PROCEDURE — 58611 PR LIGATION,FALLOPIAN TUBE W/C-SECTION: ICD-10-PCS | Mod: ,,, | Performed by: OBSTETRICS & GYNECOLOGY

## 2020-11-11 PROCEDURE — 82803 BLOOD GASES ANY COMBINATION: CPT

## 2020-11-11 PROCEDURE — 59514 PRA REAN DELIVERY ONLY: ICD-10-PCS | Mod: AA,,, | Performed by: ANESTHESIOLOGY

## 2020-11-11 PROCEDURE — 25000003 PHARM REV CODE 250: Performed by: ANESTHESIOLOGY

## 2020-11-11 PROCEDURE — 72100003 HC LABOR CARE, EA. ADDL. 8 HRS

## 2020-11-11 PROCEDURE — 88307 PR  SURG PATH,LEVEL V: ICD-10-PCS | Mod: 26,,, | Performed by: PATHOLOGY

## 2020-11-11 PROCEDURE — 01968 PR INSERT CATH,ART,PERCUT,SHORTTERM: ICD-10-PCS | Mod: AA,,, | Performed by: ANESTHESIOLOGY

## 2020-11-11 PROCEDURE — 84156 ASSAY OF PROTEIN URINE: CPT

## 2020-11-11 PROCEDURE — 37000009 HC ANESTHESIA EA ADD 15 MINS: Performed by: OBSTETRICS & GYNECOLOGY

## 2020-11-11 PROCEDURE — 36004724 HC OB OR TIME LEV III - 1ST 15 MIN: Performed by: OBSTETRICS & GYNECOLOGY

## 2020-11-11 PROCEDURE — 27200710 HC EPIDURAL INFUSION PUMP SET: Performed by: ANESTHESIOLOGY

## 2020-11-11 PROCEDURE — 37000008 HC ANESTHESIA 1ST 15 MINUTES: Performed by: OBSTETRICS & GYNECOLOGY

## 2020-11-11 PROCEDURE — 99900035 HC TECH TIME PER 15 MIN (STAT)

## 2020-11-11 PROCEDURE — 27000181 HC CABLE, IUPC

## 2020-11-11 RX ORDER — LIDOCAINE HYDROCHLORIDE AND EPINEPHRINE 15; 5 MG/ML; UG/ML
INJECTION, SOLUTION EPIDURAL
Status: DISCONTINUED | OUTPATIENT
Start: 2020-11-11 | End: 2020-11-11

## 2020-11-11 RX ORDER — HYDROCODONE BITARTRATE AND ACETAMINOPHEN 10; 325 MG/1; MG/1
1 TABLET ORAL EVERY 4 HOURS PRN
Status: DISCONTINUED | OUTPATIENT
Start: 2020-11-12 | End: 2020-11-12

## 2020-11-11 RX ORDER — SODIUM CITRATE AND CITRIC ACID MONOHYDRATE 334; 500 MG/5ML; MG/5ML
30 SOLUTION ORAL ONCE
Status: DISCONTINUED | OUTPATIENT
Start: 2020-11-11 | End: 2020-11-11

## 2020-11-11 RX ORDER — PROMETHAZINE HYDROCHLORIDE 25 MG/ML
INJECTION, SOLUTION INTRAMUSCULAR; INTRAVENOUS
Status: DISCONTINUED | OUTPATIENT
Start: 2020-11-11 | End: 2020-11-11

## 2020-11-11 RX ORDER — FENTANYL CITRATE 50 UG/ML
INJECTION, SOLUTION INTRAMUSCULAR; INTRAVENOUS
Status: COMPLETED
Start: 2020-11-11 | End: 2020-11-11

## 2020-11-11 RX ORDER — KETOROLAC TROMETHAMINE 30 MG/ML
30 INJECTION, SOLUTION INTRAMUSCULAR; INTRAVENOUS EVERY 6 HOURS PRN
Status: DISCONTINUED | OUTPATIENT
Start: 2020-11-11 | End: 2020-11-11

## 2020-11-11 RX ORDER — ACETAMINOPHEN 10 MG/ML
INJECTION, SOLUTION INTRAVENOUS
Status: DISCONTINUED | OUTPATIENT
Start: 2020-11-11 | End: 2020-11-11

## 2020-11-11 RX ORDER — ONDANSETRON 2 MG/ML
4 INJECTION INTRAMUSCULAR; INTRAVENOUS EVERY 6 HOURS PRN
Status: ACTIVE | OUTPATIENT
Start: 2020-11-11 | End: 2020-11-12

## 2020-11-11 RX ORDER — SODIUM CHLORIDE, SODIUM LACTATE, POTASSIUM CHLORIDE, CALCIUM CHLORIDE 600; 310; 30; 20 MG/100ML; MG/100ML; MG/100ML; MG/100ML
INJECTION, SOLUTION INTRAVENOUS CONTINUOUS PRN
Status: DISCONTINUED | OUTPATIENT
Start: 2020-11-11 | End: 2020-11-11

## 2020-11-11 RX ORDER — OXYCODONE HYDROCHLORIDE 5 MG/1
5 TABLET ORAL EVERY 4 HOURS PRN
Status: DISCONTINUED | OUTPATIENT
Start: 2020-11-11 | End: 2020-11-12

## 2020-11-11 RX ORDER — KETOROLAC TROMETHAMINE 30 MG/ML
30 INJECTION, SOLUTION INTRAMUSCULAR; INTRAVENOUS EVERY 6 HOURS
Status: COMPLETED | OUTPATIENT
Start: 2020-11-11 | End: 2020-11-12

## 2020-11-11 RX ORDER — AMOXICILLIN 250 MG
1 CAPSULE ORAL NIGHTLY PRN
Status: DISCONTINUED | OUTPATIENT
Start: 2020-11-11 | End: 2020-11-14 | Stop reason: HOSPADM

## 2020-11-11 RX ORDER — MISOPROSTOL 200 UG/1
800 TABLET ORAL
Status: DISCONTINUED | OUTPATIENT
Start: 2020-11-11 | End: 2020-11-14 | Stop reason: HOSPADM

## 2020-11-11 RX ORDER — SIMETHICONE 80 MG
1 TABLET,CHEWABLE ORAL EVERY 6 HOURS PRN
Status: DISCONTINUED | OUTPATIENT
Start: 2020-11-11 | End: 2020-11-13

## 2020-11-11 RX ORDER — FAMOTIDINE 10 MG/ML
INJECTION INTRAVENOUS
Status: DISPENSED
Start: 2020-11-11 | End: 2020-11-12

## 2020-11-11 RX ORDER — IPRATROPIUM BROMIDE AND ALBUTEROL SULFATE 2.5; .5 MG/3ML; MG/3ML
3 SOLUTION RESPIRATORY (INHALATION)
Status: DISCONTINUED | OUTPATIENT
Start: 2020-11-11 | End: 2020-11-11

## 2020-11-11 RX ORDER — OXYTOCIN 10 [USP'U]/ML
INJECTION, SOLUTION INTRAMUSCULAR; INTRAVENOUS
Status: DISCONTINUED | OUTPATIENT
Start: 2020-11-11 | End: 2020-11-11

## 2020-11-11 RX ORDER — FENTANYL CITRATE 50 UG/ML
INJECTION, SOLUTION INTRAMUSCULAR; INTRAVENOUS
Status: DISCONTINUED | OUTPATIENT
Start: 2020-11-11 | End: 2020-11-11

## 2020-11-11 RX ORDER — ENOXAPARIN SODIUM 100 MG/ML
40 INJECTION SUBCUTANEOUS EVERY 12 HOURS
Status: DISCONTINUED | OUTPATIENT
Start: 2020-11-12 | End: 2020-11-14 | Stop reason: HOSPADM

## 2020-11-11 RX ORDER — BUPIVACAINE HYDROCHLORIDE 2.5 MG/ML
INJECTION, SOLUTION EPIDURAL; INFILTRATION; INTRACAUDAL
Status: DISPENSED
Start: 2020-11-11 | End: 2020-11-11

## 2020-11-11 RX ORDER — HYDROCODONE BITARTRATE AND ACETAMINOPHEN 500; 5 MG/1; MG/1
TABLET ORAL
Status: DISCONTINUED | OUTPATIENT
Start: 2020-11-11 | End: 2020-11-11

## 2020-11-11 RX ORDER — CLINDAMYCIN PHOSPHATE 900 MG/50ML
900 INJECTION, SOLUTION INTRAVENOUS
Status: DISCONTINUED | OUTPATIENT
Start: 2020-11-11 | End: 2020-11-13

## 2020-11-11 RX ORDER — BUPIVACAINE HYDROCHLORIDE 2.5 MG/ML
INJECTION, SOLUTION EPIDURAL; INFILTRATION; INTRACAUDAL
Status: DISCONTINUED | OUTPATIENT
Start: 2020-11-11 | End: 2020-11-11

## 2020-11-11 RX ORDER — DIPHENOXYLATE HYDROCHLORIDE AND ATROPINE SULFATE 2.5; .025 MG/1; MG/1
1 TABLET ORAL 4 TIMES DAILY PRN
Status: DISCONTINUED | OUTPATIENT
Start: 2020-11-11 | End: 2020-11-14 | Stop reason: HOSPADM

## 2020-11-11 RX ORDER — DIPHENHYDRAMINE HCL 25 MG
25 CAPSULE ORAL EVERY 4 HOURS PRN
Status: DISCONTINUED | OUTPATIENT
Start: 2020-11-11 | End: 2020-11-14 | Stop reason: HOSPADM

## 2020-11-11 RX ORDER — KETOROLAC TROMETHAMINE 30 MG/ML
15 INJECTION, SOLUTION INTRAMUSCULAR; INTRAVENOUS EVERY 6 HOURS PRN
Status: DISCONTINUED | OUTPATIENT
Start: 2020-11-11 | End: 2020-11-11

## 2020-11-11 RX ORDER — FENTANYL/BUPIVACAINE/NS/PF 2MCG/ML-.1
PLASTIC BAG, INJECTION (ML) INJECTION
Status: DISPENSED
Start: 2020-11-11 | End: 2020-11-11

## 2020-11-11 RX ORDER — FAMOTIDINE 10 MG/ML
20 INJECTION INTRAVENOUS ONCE
Status: COMPLETED | OUTPATIENT
Start: 2020-11-11 | End: 2020-11-11

## 2020-11-11 RX ORDER — SODIUM CHLORIDE, SODIUM LACTATE, POTASSIUM CHLORIDE, CALCIUM CHLORIDE 600; 310; 30; 20 MG/100ML; MG/100ML; MG/100ML; MG/100ML
INJECTION, SOLUTION INTRAVENOUS CONTINUOUS
Status: ACTIVE | OUTPATIENT
Start: 2020-11-11 | End: 2020-11-12

## 2020-11-11 RX ORDER — PRENATAL WITH FERROUS FUM AND FOLIC ACID 3080; 920; 120; 400; 22; 1.84; 3; 20; 10; 1; 12; 200; 27; 25; 2 [IU]/1; [IU]/1; MG/1; [IU]/1; MG/1; MG/1; MG/1; MG/1; MG/1; MG/1; UG/1; MG/1; MG/1; MG/1; MG/1
1 TABLET ORAL DAILY
Status: DISCONTINUED | OUTPATIENT
Start: 2020-11-12 | End: 2020-11-14 | Stop reason: HOSPADM

## 2020-11-11 RX ORDER — OXYTOCIN/RINGER'S LACTATE 30/500 ML
95 PLASTIC BAG, INJECTION (ML) INTRAVENOUS ONCE
Status: DISCONTINUED | OUTPATIENT
Start: 2020-11-11 | End: 2020-11-14 | Stop reason: HOSPADM

## 2020-11-11 RX ORDER — HYDROCORTISONE 25 MG/G
CREAM TOPICAL 3 TIMES DAILY PRN
Status: DISCONTINUED | OUTPATIENT
Start: 2020-11-11 | End: 2020-11-14 | Stop reason: HOSPADM

## 2020-11-11 RX ORDER — PHENYLEPHRINE HYDROCHLORIDE 10 MG/ML
INJECTION INTRAVENOUS
Status: DISCONTINUED | OUTPATIENT
Start: 2020-11-11 | End: 2020-11-11

## 2020-11-11 RX ORDER — IBUPROFEN 600 MG/1
600 TABLET ORAL EVERY 6 HOURS
Status: DISCONTINUED | OUTPATIENT
Start: 2020-11-12 | End: 2020-11-14 | Stop reason: HOSPADM

## 2020-11-11 RX ORDER — HYDROCODONE BITARTRATE AND ACETAMINOPHEN 5; 325 MG/1; MG/1
1 TABLET ORAL EVERY 4 HOURS PRN
Status: DISCONTINUED | OUTPATIENT
Start: 2020-11-12 | End: 2020-11-12

## 2020-11-11 RX ORDER — BUPIVACAINE HYDROCHLORIDE 2.5 MG/ML
INJECTION, SOLUTION EPIDURAL; INFILTRATION; INTRACAUDAL
Status: COMPLETED
Start: 2020-11-11 | End: 2020-11-11

## 2020-11-11 RX ORDER — FENTANYL/BUPIVACAINE/NS/PF 2MCG/ML-.1
10 PLASTIC BAG, INJECTION (ML) INJECTION CONTINUOUS
Status: DISCONTINUED | OUTPATIENT
Start: 2020-11-11 | End: 2020-11-11

## 2020-11-11 RX ORDER — ONDANSETRON 8 MG/1
8 TABLET, ORALLY DISINTEGRATING ORAL EVERY 8 HOURS PRN
Status: DISCONTINUED | OUTPATIENT
Start: 2020-11-11 | End: 2020-11-14 | Stop reason: HOSPADM

## 2020-11-11 RX ORDER — DIPHENHYDRAMINE HCL 25 MG
25 CAPSULE ORAL EVERY 6 HOURS PRN
Status: DISCONTINUED | OUTPATIENT
Start: 2020-11-11 | End: 2020-11-11

## 2020-11-11 RX ORDER — LIDOCAINE HCL/EPINEPHRINE/PF 2%-1:200K
VIAL (ML) INJECTION
Status: DISCONTINUED | OUTPATIENT
Start: 2020-11-11 | End: 2020-11-11

## 2020-11-11 RX ORDER — OXYCODONE HYDROCHLORIDE 5 MG/1
10 TABLET ORAL EVERY 4 HOURS PRN
Status: DISCONTINUED | OUTPATIENT
Start: 2020-11-11 | End: 2020-11-12

## 2020-11-11 RX ORDER — ONDANSETRON 2 MG/ML
4 INJECTION INTRAMUSCULAR; INTRAVENOUS ONCE
Status: COMPLETED | OUTPATIENT
Start: 2020-11-11 | End: 2020-11-11

## 2020-11-11 RX ORDER — ADHESIVE BANDAGE
30 BANDAGE TOPICAL 2 TIMES DAILY PRN
Status: DISCONTINUED | OUTPATIENT
Start: 2020-11-12 | End: 2020-11-14 | Stop reason: HOSPADM

## 2020-11-11 RX ORDER — ACETAMINOPHEN 325 MG/1
650 TABLET ORAL EVERY 6 HOURS
Status: DISPENSED | OUTPATIENT
Start: 2020-11-12 | End: 2020-11-12

## 2020-11-11 RX ORDER — METHYLERGONOVINE MALEATE 0.2 MG/ML
200 INJECTION INTRAVENOUS
Status: DISCONTINUED | OUTPATIENT
Start: 2020-11-11 | End: 2020-11-14 | Stop reason: HOSPADM

## 2020-11-11 RX ORDER — MUPIROCIN 20 MG/G
OINTMENT TOPICAL 2 TIMES DAILY
Status: DISCONTINUED | OUTPATIENT
Start: 2020-11-11 | End: 2020-11-14 | Stop reason: HOSPADM

## 2020-11-11 RX ORDER — IPRATROPIUM BROMIDE AND ALBUTEROL SULFATE 2.5; .5 MG/3ML; MG/3ML
3 SOLUTION RESPIRATORY (INHALATION) EVERY 6 HOURS PRN
Status: DISCONTINUED | OUTPATIENT
Start: 2020-11-11 | End: 2020-11-11

## 2020-11-11 RX ORDER — KETOROLAC TROMETHAMINE 30 MG/ML
30 INJECTION, SOLUTION INTRAMUSCULAR; INTRAVENOUS EVERY 6 HOURS
Status: DISCONTINUED | OUTPATIENT
Start: 2020-11-12 | End: 2020-11-11

## 2020-11-11 RX ORDER — ONDANSETRON HYDROCHLORIDE 2 MG/ML
INJECTION, SOLUTION INTRAMUSCULAR; INTRAVENOUS
Status: DISCONTINUED | OUTPATIENT
Start: 2020-11-11 | End: 2020-11-11

## 2020-11-11 RX ORDER — DOCUSATE SODIUM 100 MG/1
200 CAPSULE, LIQUID FILLED ORAL 2 TIMES DAILY
Status: DISCONTINUED | OUTPATIENT
Start: 2020-11-11 | End: 2020-11-14 | Stop reason: HOSPADM

## 2020-11-11 RX ORDER — DEXAMETHASONE SODIUM PHOSPHATE 4 MG/ML
INJECTION, SOLUTION INTRA-ARTICULAR; INTRALESIONAL; INTRAMUSCULAR; INTRAVENOUS; SOFT TISSUE
Status: DISCONTINUED | OUTPATIENT
Start: 2020-11-11 | End: 2020-11-11

## 2020-11-11 RX ORDER — MORPHINE SULFATE 0.5 MG/ML
INJECTION, SOLUTION EPIDURAL; INTRATHECAL; INTRAVENOUS
Status: DISCONTINUED | OUTPATIENT
Start: 2020-11-11 | End: 2020-11-11

## 2020-11-11 RX ORDER — SODIUM CHLORIDE 0.9 % (FLUSH) 0.9 %
10 SYRINGE (ML) INJECTION
Status: DISCONTINUED | OUTPATIENT
Start: 2020-11-11 | End: 2020-11-11

## 2020-11-11 RX ORDER — BISACODYL 10 MG
10 SUPPOSITORY, RECTAL RECTAL ONCE AS NEEDED
Status: DISCONTINUED | OUTPATIENT
Start: 2020-11-11 | End: 2020-11-14 | Stop reason: HOSPADM

## 2020-11-11 RX ORDER — CARBOPROST TROMETHAMINE 250 UG/ML
250 INJECTION, SOLUTION INTRAMUSCULAR
Status: DISCONTINUED | OUTPATIENT
Start: 2020-11-11 | End: 2020-11-14 | Stop reason: HOSPADM

## 2020-11-11 RX ADMIN — DEXTROSE 3 MILLION UNITS: 50 INJECTION, SOLUTION INTRAVENOUS at 04:11

## 2020-11-11 RX ADMIN — ONDANSETRON 4 MG: 2 INJECTION INTRAMUSCULAR; INTRAVENOUS at 04:11

## 2020-11-11 RX ADMIN — SODIUM CHLORIDE, SODIUM LACTATE, POTASSIUM CHLORIDE, AND CALCIUM CHLORIDE: 600; 310; 30; 20 INJECTION, SOLUTION INTRAVENOUS at 05:11

## 2020-11-11 RX ADMIN — ACETAMINOPHEN 1000 MG: 10 INJECTION, SOLUTION INTRAVENOUS at 05:11

## 2020-11-11 RX ADMIN — FENTANYL CITRATE 100 MCG: 50 INJECTION, SOLUTION INTRAMUSCULAR; INTRAVENOUS at 12:11

## 2020-11-11 RX ADMIN — DEXAMETHASONE SODIUM PHOSPHATE 4 MG: 4 INJECTION, SOLUTION INTRAMUSCULAR; INTRAVENOUS at 05:11

## 2020-11-11 RX ADMIN — GENTAMICIN SULFATE 435.6 MG: 40 INJECTION, SOLUTION INTRAMUSCULAR; INTRAVENOUS at 10:11

## 2020-11-11 RX ADMIN — OXYTOCIN 4 UNITS: 10 INJECTION, SOLUTION INTRAMUSCULAR; INTRAVENOUS at 05:11

## 2020-11-11 RX ADMIN — SODIUM CHLORIDE, SODIUM LACTATE, POTASSIUM CHLORIDE, AND CALCIUM CHLORIDE 1000 ML: .6; .31; .03; .02 INJECTION, SOLUTION INTRAVENOUS at 12:11

## 2020-11-11 RX ADMIN — OXYTOCIN 4 UNITS: 10 INJECTION, SOLUTION INTRAMUSCULAR; INTRAVENOUS at 06:11

## 2020-11-11 RX ADMIN — PHENYLEPHRINE HYDROCHLORIDE 200 MCG: 10 INJECTION INTRAVENOUS at 05:11

## 2020-11-11 RX ADMIN — SODIUM CHLORIDE 3 G: 9 INJECTION, SOLUTION INTRAVENOUS at 05:11

## 2020-11-11 RX ADMIN — DEXTROSE 3 MILLION UNITS: 50 INJECTION, SOLUTION INTRAVENOUS at 12:11

## 2020-11-11 RX ADMIN — SODIUM CHLORIDE, SODIUM LACTATE, POTASSIUM CHLORIDE, AND CALCIUM CHLORIDE: .6; .31; .03; .02 INJECTION, SOLUTION INTRAVENOUS at 04:11

## 2020-11-11 RX ADMIN — FAMOTIDINE 20 MG: 10 INJECTION INTRAVENOUS at 04:11

## 2020-11-11 RX ADMIN — DIPHENHYDRAMINE HYDROCHLORIDE 25 MG: 25 CAPSULE ORAL at 08:11

## 2020-11-11 RX ADMIN — AMPICILLIN SODIUM 2 G: 2 INJECTION, POWDER, FOR SOLUTION INTRAMUSCULAR; INTRAVENOUS at 08:11

## 2020-11-11 RX ADMIN — OXYTOCIN 8 UNITS: 10 INJECTION, SOLUTION INTRAMUSCULAR; INTRAVENOUS at 06:11

## 2020-11-11 RX ADMIN — SODIUM CHLORIDE, SODIUM LACTATE, POTASSIUM CHLORIDE, AND CALCIUM CHLORIDE: .6; .31; .03; .02 INJECTION, SOLUTION INTRAVENOUS at 09:11

## 2020-11-11 RX ADMIN — IPRATROPIUM BROMIDE AND ALBUTEROL SULFATE 3 ML: .5; 3 SOLUTION RESPIRATORY (INHALATION) at 11:11

## 2020-11-11 RX ADMIN — BUPIVACAINE HYDROCHLORIDE 5 ML: 2.5 INJECTION, SOLUTION EPIDURAL; INFILTRATION; INTRACAUDAL; PERINEURAL at 08:11

## 2020-11-11 RX ADMIN — FENTANYL CITRATE 100 MCG: 50 INJECTION, SOLUTION INTRAMUSCULAR; INTRAVENOUS at 03:11

## 2020-11-11 RX ADMIN — OXYTOCIN 6 UNITS: 10 INJECTION, SOLUTION INTRAMUSCULAR; INTRAVENOUS at 06:11

## 2020-11-11 RX ADMIN — MORPHINE SULFATE 1.5 MG: 0.5 INJECTION, SOLUTION EPIDURAL; INTRATHECAL; INTRAVENOUS at 06:11

## 2020-11-11 RX ADMIN — PROMETHAZINE HYDROCHLORIDE 12.5 MG: 25 INJECTION INTRAMUSCULAR; INTRAVENOUS at 05:11

## 2020-11-11 RX ADMIN — Medication 95 MILLI-UNITS/MIN: at 07:11

## 2020-11-11 RX ADMIN — CLINDAMYCIN IN 5 PERCENT DEXTROSE 900 MG: 18 INJECTION, SOLUTION INTRAVENOUS at 09:11

## 2020-11-11 RX ADMIN — LIDOCAINE HYDROCHLORIDE,EPINEPHRINE BITARTRATE 2 ML: 20; .005 INJECTION, SOLUTION EPIDURAL; INFILTRATION; INTRACAUDAL; PERINEURAL at 05:11

## 2020-11-11 RX ADMIN — PHENYLEPHRINE HYDROCHLORIDE 50 MCG/MIN: 10 INJECTION INTRAVENOUS at 05:11

## 2020-11-11 RX ADMIN — LIDOCAINE HYDROCHLORIDE,EPINEPHRINE BITARTRATE 3 ML: 15; .005 INJECTION, SOLUTION EPIDURAL; INFILTRATION; INTRACAUDAL; PERINEURAL at 12:11

## 2020-11-11 RX ADMIN — KETOROLAC TROMETHAMINE 30 MG: 30 INJECTION, SOLUTION INTRAMUSCULAR at 08:11

## 2020-11-11 RX ADMIN — IPRATROPIUM BROMIDE AND ALBUTEROL SULFATE 3 ML: .5; 3 SOLUTION RESPIRATORY (INHALATION) at 02:11

## 2020-11-11 RX ADMIN — AZITHROMYCIN MONOHYDRATE 500 MG: 500 INJECTION, POWDER, LYOPHILIZED, FOR SOLUTION INTRAVENOUS at 04:11

## 2020-11-11 RX ADMIN — SODIUM CHLORIDE, SODIUM LACTATE, POTASSIUM CHLORIDE, AND CALCIUM CHLORIDE: 600; 310; 30; 20 INJECTION, SOLUTION INTRAVENOUS at 06:11

## 2020-11-11 RX ADMIN — DEXTROSE 3 MILLION UNITS: 50 INJECTION, SOLUTION INTRAVENOUS at 08:11

## 2020-11-11 RX ADMIN — OXYTOCIN 2 UNITS: 10 INJECTION, SOLUTION INTRAMUSCULAR; INTRAVENOUS at 06:11

## 2020-11-11 RX ADMIN — ONDANSETRON 4 MG: 2 INJECTION, SOLUTION INTRAMUSCULAR; INTRAVENOUS at 05:11

## 2020-11-11 RX ADMIN — Medication 5 ML: at 12:11

## 2020-11-11 RX ADMIN — FENTANYL CITRATE 100 MCG: 50 INJECTION, SOLUTION INTRAMUSCULAR; INTRAVENOUS at 05:11

## 2020-11-11 RX ADMIN — DIPHENHYDRAMINE HYDROCHLORIDE 25 MG: 25 CAPSULE ORAL at 02:11

## 2020-11-11 RX ADMIN — LIDOCAINE HYDROCHLORIDE,EPINEPHRINE BITARTRATE 3 ML: 20; .005 INJECTION, SOLUTION EPIDURAL; INFILTRATION; INTRACAUDAL; PERINEURAL at 05:11

## 2020-11-11 RX ADMIN — LIDOCAINE HYDROCHLORIDE,EPINEPHRINE BITARTRATE 2 ML: 20; .005 INJECTION, SOLUTION EPIDURAL; INFILTRATION; INTRACAUDAL; PERINEURAL at 06:11

## 2020-11-11 RX ADMIN — LIDOCAINE HYDROCHLORIDE,EPINEPHRINE BITARTRATE 5 ML: 20; .005 INJECTION, SOLUTION EPIDURAL; INFILTRATION; INTRACAUDAL; PERINEURAL at 05:11

## 2020-11-11 NOTE — ANESTHESIA PREPROCEDURE EVALUATION
11/10/2020      Liliya Gaston is a 25 y.o. female  at 38w0d who presents for IOL.  Di-di twin pregnancy, complicated by GBBS and Trichomonas infections, as well as a syncopal episode about 2 weeks ago.  Episode was worked up and determined to be 2/2 hypoglycemia.  Previous delivery was via LTCS.      OB History    Para Term  AB Living   3 1 1   1 1   SAB TAB Ectopic Multiple Live Births   1       1      # Outcome Date GA Lbr Wale/2nd Weight Sex Delivery Anes PTL Lv   3 Current            2 SAB 03//     SAB      1 Term      CS-LTranv   FELICIA       Wt Readings from Last 1 Encounters:   20 0847 (!) 142.6 kg (314 lb 6 oz)       BP Readings from Last 3 Encounters:   20 120/74   10/30/20 132/84   10/26/20 130/86       Patient Active Problem List   Diagnosis    Supervision of normal pregnancy    Asthma complicating pregnancy, antepartum    Obesity complicating pregnancy, third trimester    Dichorionic diamniotic twin pregnancy in third trimester    Acute bilateral low back pain without sciatica    Decreased functional mobility and endurance    Echogenic intracardiac focus of fetus on prenatal ultrasound    History of  delivery affecting pregnancy- history of LTCS, records in chart    Positive GBS test       No past surgical history on file.    Social History     Socioeconomic History    Marital status:      Spouse name: Not on file    Number of children: Not on file    Years of education: Not on file    Highest education level: Not on file   Occupational History    Not on file   Social Needs    Financial resource strain: Not on file    Food insecurity     Worry: Not on file     Inability: Not on file    Transportation needs     Medical: Not on file     Non-medical: Not on file   Tobacco Use    Smoking status: Never Smoker    Smokeless tobacco:  Never Used   Substance and Sexual Activity    Alcohol use: No    Drug use: No    Sexual activity: Not Currently   Lifestyle    Physical activity     Days per week: Not on file     Minutes per session: Not on file    Stress: Not on file   Relationships    Social connections     Talks on phone: Not on file     Gets together: Not on file     Attends Hinduism service: Not on file     Active member of club or organization: Not on file     Attends meetings of clubs or organizations: Not on file     Relationship status: Not on file   Other Topics Concern    Not on file   Social History Narrative    Not on file         Chemistry        Component Value Date/Time     10/26/2020 1753    K 3.7 10/26/2020 1753     10/26/2020 1753    CO2 20 (L) 10/26/2020 1753    BUN 4 (L) 10/26/2020 1753    CREATININE 0.6 10/26/2020 1753    GLU 93 10/26/2020 1753        Component Value Date/Time    CALCIUM 8.9 10/26/2020 1753    ALKPHOS 194 (H) 10/26/2020 1753    AST 10 10/26/2020 1753    ALT 8 (L) 10/26/2020 1753    BILITOT 0.4 10/26/2020 1753    ESTGFRAFRICA >60 10/26/2020 1753    EGFRNONAA >60 10/26/2020 1753            Lab Results   Component Value Date    WBC 8.81 10/26/2020    HGB 11.7 (L) 10/26/2020    HCT 35.6 (L) 10/26/2020    MCV 84 10/26/2020     10/26/2020       No results for input(s): PT, INR, PROTIME, APTT in the last 72 hours.                Anesthesia Evaluation    I have reviewed the Patient Summary Reports.    I have reviewed the Nursing Notes. I have reviewed the NPO Status.      Review of Systems  Anesthesia Hx:  Denies Family Hx of Anesthesia complications.   Denies Personal Hx of Anesthesia complications.   Hematology/Oncology:         -- Anemia:   Cardiovascular:   Exercise tolerance: good Denies CABG/stent.  Denies Dysrhythmias.   Denies Angina.        Pulmonary:   Denies COPD. Asthma    Renal/:   Denies Chronic Renal Disease.     Hepatic/GI:   Denies GERD.    Musculoskeletal:   Denies  Arthritis.     OB/GYN/PEDS:  Planned Vaginal Delivery Planned  Delivery  Multiple gestation for current pregnancy of Twins.    Neurological:   Denies CVA. Denies Seizures.    Endocrine:   Denies Diabetes.        Physical Exam  General:  Well nourished, Morbid Obesity    Airway/Jaw/Neck:  Airway Findings: Mouth Opening: Normal Tongue: Normal  General Airway Assessment: Adult  Mallampati: III  Improves to II with phonation.  TM Distance: Normal, at least 6 cm  Jaw/Neck Findings:  Neck ROM: Normal ROM      Dental:  Dental Findings: In tact   Chest/Lungs:  Chest/Lungs Findings: Clear to auscultation, Normal Respiratory Rate     Heart/Vascular:  Heart Findings: Rate: Normal  Rhythm: Regular Rhythm        Mental Status:  Mental Status Findings:  Cooperative, Alert and Oriented         Anesthesia Plan  Type of Anesthesia, risks & benefits discussed:  Anesthesia Type:  CSE, epidural, general, spinal  Patient's Preference:   Intra-op Monitoring Plan: standard ASA monitors  Intra-op Monitoring Plan Comments:   Post Op Pain Control Plan: multimodal analgesia, IV/PO Opioids PRN and per primary service following discharge from PACU  Post Op Pain Control Plan Comments:   Induction:   IV  Beta Blocker:  Patient is not currently on a Beta-Blocker (No further documentation required).       Informed Consent: Patient understands risks and agrees with Anesthesia plan.  Questions answered. Anesthesia consent signed with patient.  ASA Score: 3     Day of Surgery Review of History & Physical:    H&P update referred to the provider.         Ready For Surgery From Anesthesia Perspective.

## 2020-11-11 NOTE — PROGRESS NOTES
"S: 25 y.o.  at 38w1d currently admitted for induction of labor for di-di twins at 38w1d. Reports some sacral sparing with her epidural.    O:   Temp:  [96.3 °F (35.7 °C)-98.1 °F (36.7 °C)] 97 °F (36.1 °C)  Pulse:  [] 111  Resp:  [18] 18  SpO2:  [95 %-100 %] 96 %  BP: ()/(53-92) 126/75  /75   Pulse (!) 111   Temp 97 °F (36.1 °C) (Temporal)   Resp 18   Ht 5' 2" (1.575 m)   Wt (!) 142.6 kg (314 lb 6 oz)   LMP 2020 (LMP Unknown)   SpO2 96%   Breastfeeding No   BMI 57.50 kg/m²     FHT: A: 140, mod BTBV, cat I           D: 140-150, mod BTBV, cat I  [place]/IUPC: ctxns q 3, IUPC in place, pit at 12    SVE: /-2    ASSESSMENT: 38w1d   Patient Active Problem List   Diagnosis    Supervision of normal pregnancy    Asthma complicating pregnancy, antepartum    Obesity complicating pregnancy, third trimester    Dichorionic diamniotic twin pregnancy in third trimester    Acute bilateral low back pain without sciatica    Decreased functional mobility and endurance    Echogenic intracardiac focus of fetus on prenatal ultrasound    History of  delivery affecting pregnancy- history of LTCS, records in chart    Positive GBS test       PLAN:  - Continue Close Maternal/Fetal Monitoring  - Recheck 2 hours or PRN  - Pitocin Augmentation per protocol  - anesthesia to reassess pain control  - continue peanut ball  - continue TOLAC  - PCN per protocol   - breathing treatments PRN, avoid hemabate  - high risk for PPH, will have all alternate drugs available, will consent for TXA trial    Radha Arenas, DO        "

## 2020-11-11 NOTE — PLAN OF CARE
Pt VSS, pt epidural effective. PRN positions changes. Pt at 10ml. Pt safety maintained, call light in reach. Will continue to monitor.

## 2020-11-11 NOTE — PLAN OF CARE
Problem: Adult Inpatient Plan of Care  Goal: Plan of Care Review  Outcome: Ongoing, Not Progressing  Goal: Patient-Specific Goal (Individualization)  Outcome: Ongoing, Not Progressing  Goal: Absence of Hospital-Acquired Illness or Injury  Outcome: Ongoing, Not Progressing  Goal: Optimal Comfort and Wellbeing  Outcome: Ongoing, Not Progressing  Goal: Readiness for Transition of Care  Outcome: Ongoing, Not Progressing  Goal: Rounds/Family Conference  Outcome: Ongoing, Not Progressing     Problem: Bariatric Environmental Safety  Goal: Safety Maintained with Care  Outcome: Ongoing, Not Progressing     Problem:  Fall Injury Risk  Goal: Absence of Fall, Infant Drop and Related Injury  Outcome: Ongoing, Not Progressing     Problem: Infection  Goal: Infection Symptom Resolution  Outcome: Ongoing, Not Progressing     Problem: Bleeding (Labor)  Goal: Hemostasis  Outcome: Ongoing, Not Progressing     Problem: Change in Fetal Wellbeing (Labor)  Goal: Stable Fetal Wellbeing  Outcome: Ongoing, Not Progressing     Problem: Delayed Labor Progression (Labor)  Goal: Effective Progression to Delivery  Outcome: Ongoing, Not Progressing     Problem: Infection (Labor)  Goal: Absence of Infection Signs/Symptoms  Outcome: Ongoing, Not Progressing     Problem: Labor Pain (Labor)  Goal: Acceptable Pain Control  Outcome: Ongoing, Not Progressing     Problem: Uterine Tachysystole (Labor)  Goal: Normal Uterine Contraction Pattern  Outcome: Ongoing, Not Progressing     Problem: Breastfeeding  Goal: Effective Breastfeeding  Outcome: Ongoing, Not Progressing     Problem: Skin Injury Risk Increased  Goal: Skin Health and Integrity  Outcome: Ongoing, Not Progressing

## 2020-11-11 NOTE — CARE UPDATE
Md to bedside to discuss care plan with patient. Patient has now been 6 cm for > 6 hours without progress. Discussed recommendation of proceeding with  delivery. Patient is in agreement. She was given the opportunity to ask questions.     Alisha Gupta MD  OBGYN, PGY-3

## 2020-11-11 NOTE — H&P
HISTORY AND PHYSICAL                                                OBSTETRICS          Subjective:       Liliya Gaston is a 25 y.o.  female with IUP at 38w0d weeks gestation who presents for scheduled IOL.    This IUP is complicated by  1. Di/di twin pregnancy  2. History of CS x1 (secondary to arrest of dilation at 6 cm)  3. History of trichomonas in T1  4. Mild intermittent asthma  5. GBS positive status  6. Anemia  7. History of migraines  8. MO (BMI 57)     Patient denies contractions, denies vaginal bleeding, denies LOF.   Fetal Movement x2: normal.      Denies any covid related symptoms.    Review of Systems   Constitutional: Negative for chills and fever.   HENT: Negative for congestion and sore throat.    Eyes: Negative for blurred vision and double vision.   Respiratory: Negative for cough and shortness of breath.    Cardiovascular: Negative for chest pain, palpitations and leg swelling.   Gastrointestinal: Negative for abdominal pain, constipation, diarrhea, nausea and vomiting.   Genitourinary: Negative for dysuria, frequency and hematuria.   Musculoskeletal: Negative for back pain, joint pain and myalgias.   Neurological: Negative for dizziness, weakness and headaches.   Psychiatric/Behavioral: Negative for depression, hallucinations, substance abuse and suicidal ideas.         PMHx:   Past Medical History:   Diagnosis Date    Anemia     Asthma complicating pregnancy, antepartum 2015       PSHx: No past surgical history on file.    All: Review of patient's allergies indicates:  No Known Allergies    Meds:   Medications Prior to Admission   Medication Sig Dispense Refill Last Dose    magnesium oxide (MAG-OX) 400 mg (241.3 mg magnesium) tablet Take 1 tablet (400 mg total) by mouth once daily. (Patient not taking: Reported on 2020) 90 tablet 3     pantoprazole (PROTONIX) 40 MG tablet Take 1 tablet (40 mg total) by mouth once daily. (Patient not taking: Reported on 2020) 30  tablet 1     prenatal 21-iron fu-folic acid (PRENATAL COMPLETE) 14 mg iron- 400 mcg Tab Take 1 tablet by mouth once daily. 90 tablet 4     PROVENTIL HFA 90 mcg/actuation inhaler Inhale 1-2 puffs into the lungs every 4 (four) hours as needed for Wheezing. 18 g 2     riboflavin, vitamin B2, (VITAMIN B-2) 100 mg Tab tablet Take 2 tablets (200 mg total) by mouth once daily. (Patient not taking: Reported on 2020) 180 tablet 3     SINGULAIR 10 mg tablet Take 1 tablet (10 mg total) by mouth every evening. 30 tablet 4        SH:   Social History     Socioeconomic History    Marital status:      Spouse name: Not on file    Number of children: Not on file    Years of education: Not on file    Highest education level: Not on file   Occupational History    Not on file   Social Needs    Financial resource strain: Not on file    Food insecurity     Worry: Not on file     Inability: Not on file    Transportation needs     Medical: Not on file     Non-medical: Not on file   Tobacco Use    Smoking status: Never Smoker    Smokeless tobacco: Never Used   Substance and Sexual Activity    Alcohol use: No    Drug use: No    Sexual activity: Not Currently   Lifestyle    Physical activity     Days per week: Not on file     Minutes per session: Not on file    Stress: Not on file   Relationships    Social connections     Talks on phone: Not on file     Gets together: Not on file     Attends Restorationist service: Not on file     Active member of club or organization: Not on file     Attends meetings of clubs or organizations: Not on file     Relationship status: Not on file   Other Topics Concern    Not on file   Social History Narrative    Not on file       FH:   Family History   Problem Relation Age of Onset    Breast cancer Neg Hx     Colon cancer Neg Hx     Ovarian cancer Neg Hx        OBHx:   OB History    Para Term  AB Living   3 1 1 0 1 1   SAB TAB Ectopic Multiple Live Births   1 0 0 0 1       # Outcome Date GA Lbr Wale/2nd Weight Sex Delivery Anes PTL Lv   3 Current            2 SAB 03/04/14     SAB      1 Term      CS-LTranv   FELICIA       Objective:       LMP 02/18/2020 (LMP Unknown)   Breastfeeding No     There were no vitals filed for this visit.    General:   alert, appears stated age and cooperative   Lungs:   clear to auscultation bilaterally   Heart:   regular rate and rhythm, S1, S2 normal, no murmur, click, rub or gallop   Abdomen:  soft, non-tender; bowel sounds normal; no masses,  no organomegaly   Extremities negative edema, negative erythema   FHT: Baby A - 130, +accels, no decels, moderate btbv, Cat 1 (reassuring)  Baby B - 145, +accels, no decels, moderate btbv, Cat 1 (reassuring)                 TOCO: Irritable    Presentations: cephalic x2 by ultrasound   Cervix:     Dilation: 1cm    Effacement: 50%    Station:  -3    Consistency: medium    Position: posterior     Lab Review  Blood Type AB POS  GBBS: positive  Rubella: Immune  RPR: NR  HIV: negative  HepB: negative       Assessment:       38w0d weeks gestation who presents for scheduled IOL 2/2 di/di twin pregnancy     Active Hospital Problems    Diagnosis  POA    Dichorionic diamniotic twin pregnancy in third trimester [O30.043]  Yes      Resolved Hospital Problems   No resolved problems to display.          Plan:   1. TOLAC   Risks, benefits, alternatives and possible complications have been discussed in detail with the patient.   - Consents signed and to chart  - Admit to Labor and Delivery unit  - US: vertex/vertex position confirmed  - GBS positive, will start PCN  - Will start pitocin for labor induction given history of CS; balloon placed to aid in mechanical dilation   - Epidural per Anesthesia  - Draw CBC, T&S  - Notify Staff  - Recheck in 4 hrs or PRN    2. History of CS x1  - In 2019 secondary to arrest of dilation at 6 cm  - Op note reviewed, patient candidate for TOLAC  - After discussion of risks, benefits,  alternatives, patient wishes to proceed  -  calculator: 12.1%    3. Di/di twin pregnancy  - Last US as of  with measurements: A -3,088 (44%), B - 3,037 (39%)   - Vertex/vertex position confirmed upon arrival  - Monitor fetal heart rates closely     4. History of trichomonas in T1  - Detected on 2020  - Negative STARLA on 2020    5.  Mild intermittent asthma  - Asymptomatic  - SpO2:  [95 %-98 %] 97 %  - Has not used any albuterol this pregnancy, no history of ICU admissions or intubations  - Monitor closely     6. Anemia  - Asymptomatic  - Admit CBC:   - High post partum hemorrhage risk, continue to monitor closely     7. History of migraines  - Stable, asymptomatic  - Fioricet PRN  - Monitor blood pressures closely    8. MO (BMI 57)   - TEDs/SCDs during labor  - Lovenox in post partum period as patient's pre pregnancy BMI was >45    Post-Partum Hemorrhage risk - medium          Rebecca Estrada MD  OB/GYN  PGY-3

## 2020-11-11 NOTE — PROGRESS NOTES
Respiratory tech at bedside.    Nasrin Rafael revisited the pt to see if she would like to consent for TXA trial.  Pt declines at this time.  Nasrin left her number ([phone] or [phone]) if pt changes her mind.

## 2020-11-11 NOTE — ANESTHESIA PROCEDURE NOTES
Epidural    Patient location during procedure: OB   Reason for block: primary anesthetic   Start time: 11/11/2020 12:40 AM  Timeout: 11/11/2020 12:40 AM  End time: 11/11/2020 12:47 AM  Surgery related to: Vaginal Delivery    Staffing  Performing Provider: Wyatt Rollins MD  Authorizing Provider: Wyatt Rollins MD        Preanesthetic Checklist  Completed: patient identified, site marked, surgical consent, pre-op evaluation, timeout performed, IV checked, risks and benefits discussed, monitors and equipment checked, anesthesia consent given, hand hygiene performed and patient being monitored  Preparation  Patient position: sitting  Prep: ChloraPrep  Patient monitoring: Pulse Ox  Epidural  Skin Anesthetic: lidocaine 1%  Skin Wheal: 3 mL  Administration type: continuous  Approach: midline  Interspace: L3-4    Injection technique: JONH saline  Needle and Epidural Catheter  Needle type: Tuohy   Needle gauge: 17  Needle length: 3.5 inches  Needle insertion depth: 9 cm  Catheter type: springwLapSpace  Catheter size: 19 G  Catheter at skin depth: 14 cm  Test dose: 3 mL of lidocaine 1.5% with Epi 1-to-200,000  Additional Documentation: incremental injection, negative aspiration for heme and CSF, no paresthesia on injection, no signs/symptoms of IV or SA injection, no significant pain on injection and no significant complaints from patient  Needle localization: anatomical landmarks  Medications:  Volume per aspiration: 5 mL  Time between aspirations: 5 minutes  Assessment  Ease of block: easy  Patient's tolerance of the procedure: comfortable throughout block and no complaintsNo inadvertent dural puncture with Tuohy.  Dural puncture performed with spinal needle.

## 2020-11-11 NOTE — PROGRESS NOTES
"S: 25 y.o.  at 38w1d currently admitted for induction of labor for di-di twins at 38w1d.     O:   Temp:  [95.4 °F (35.2 °C)-98.4 °F (36.9 °C)] 97.3 °F (36.3 °C)  Pulse:  [] 117  Resp:  [18] 18  SpO2:  [95 %-100 %] 97 %  BP: ()/(53-92) 130/83  /83   Pulse (!) 117   Temp 97.3 °F (36.3 °C) (Axillary)   Resp 18   Ht 5' 2" (1.575 m)   Wt (!) 142.6 kg (314 lb 6 oz)   LMP 2020 (LMP Unknown)   SpO2 97%   Breastfeeding No   BMI 57.50 kg/m²     FHT: A: 130, mod BTBV, cat I           D: 145, mod BTBV, cat I  [place]/IUPC: ctxns q 3, IUPC in place, pit at 16, MVUs 88    SVE: /-1    ASSESSMENT: 38w1d   Patient Active Problem List   Diagnosis    Supervision of normal pregnancy    Asthma complicating pregnancy, antepartum    Obesity complicating pregnancy, third trimester    Dichorionic diamniotic twin pregnancy in third trimester    Acute bilateral low back pain without sciatica    Decreased functional mobility and endurance    Echogenic intracardiac focus of fetus on prenatal ultrasound    History of  delivery affecting pregnancy- history of LTCS, records in chart    Positive GBS test       PLAN:  - Continue Close Maternal/Fetal Monitoring  - Recheck 2 hours or PRN  - Pitocin Augmentation per protocol  - anesthesia to reassess pain control  - continue peanut ball  - continue TOLAC  - PCN per protocol   - breathing treatments PRN, avoid hemabate  - high risk for PPH, will have all alternate drugs available, will consent for TXA trial    Abena Rivers MD/MPH  OB/GYN PGY1          "

## 2020-11-11 NOTE — PLAN OF CARE
20 0833   OB SCREEN   Source of Information health record   Received Prenatal Care Yes   Any indications/suspicions for None   Is this a teen pregnancy No   Indication for adoption/Safe Haven No   Indication for DME/post-acute needs No   HIV (+) No   Any congenital  disorders No   Fetal demise/ death No       This patient has been screened for Social Work discharge planning needs. Based on  documentation in medical record , no discharge planning needs are anticipated at this time. Should any discharge planning needs arise, please consult . For urgent needs/consults, contact the  listed below at the number provided.     Nancy Miller LCSW    [facility]  [email or web address]    (phone) [phone] or  [place]. [phone]  (fax) [phone]

## 2020-11-11 NOTE — PROGRESS NOTES
"S: 25 y.o.  at 38w1d currently admitted for induction of labor for di-di twins at 38w1d. MD to bedside for pt reporting.    O:   Temp:  [95.4 °F (35.2 °C)-99.1 °F (37.3 °C)] 97.6 °F (36.4 °C)  Pulse:  [] 144  Resp:  [18] 18  SpO2:  [95 %-100 %] 98 %  BP: ()/(53-92) 124/74  /74   Pulse (!) 144   Temp 97.6 °F (36.4 °C) (Oral)   Resp 18   Ht 5' 2" (1.575 m)   Wt (!) 142.6 kg (314 lb 6 oz)   LMP 2020 (LMP Unknown)   SpO2 98%   Breastfeeding No   BMI 57.50 kg/m²     FHT: A: 150, mod BTBV, cat I           D: 155, mod BTBV, cat I  [place]/IUPC: ctxns q 3, IUPC in place, pit at 18, MVUs 84    SVE:     ASSESSMENT: 38w1d   Patient Active Problem List   Diagnosis    Supervision of normal pregnancy    Asthma complicating pregnancy, antepartum    Obesity complicating pregnancy, third trimester    Dichorionic diamniotic twin pregnancy in third trimester    Acute bilateral low back pain without sciatica    Decreased functional mobility and endurance    Echogenic intracardiac focus of fetus on prenatal ultrasound    History of  delivery affecting pregnancy- history of LTCS, records in chart    Positive GBS test     TIMELINE:  2330: 3  0145: 2, AROM clr, pit @ 4 mU/min   0500: 2  0830: 2  1200:   1400:   1525:       PLAN:  - Continue Close Maternal/Fetal Monitoring  - Recheck 2 hours or PRN  - Pitocin Augmentation per protocol  - anesthesia to reassess pain control  - continue peanut ball  - continue TOLAC  - PCN per protocol   - breathing treatments PRN, avoid hemabate  - high risk for PPH, will have all alternate drugs available, will consent for TXA trial    Abena Rivers MD/MPH  OB/GYN PGY1          "

## 2020-11-11 NOTE — PROGRESS NOTES
11/10/20 2223   TeleStork Mery Note - Strip   Strip Reviewed by Mery Nurse? Yes   TeleStork Mery Note - Communication   [place] Nurse Communicated with Bedside Nurse Regarding: Fetal Status   TeleStork Mery Note - Notification   Nurse Notified? Yes  (at BS)   Name of Nurse Sybil

## 2020-11-11 NOTE — PROGRESS NOTES
"LABOR NOTE    S:  Complaints: No.  Epidural working:  yes  MD to bedside for evaluation     O: /61   Pulse 109   Temp 97 °F (36.1 °C)   Resp 18   Ht 5' 2" (1.575 m)   Wt (!) 142.6 kg (314 lb 6 oz)   LMP 2020 (LMP Unknown)   SpO2 98%   Breastfeeding No   BMI 57.50 kg/m²       FHT: overall at times, difficult to keep Baby A and B on monitor secondary to fetal movement but overall:    Baby A - 145, reactive/reassuring   Baby B - 120, reactive/reassuring   CTX: q 2-4 minutes  SVE: /-2  AROM, clr   Pit @ 4 mU/min      ASSESSMENT:   25 y.o.  at 38w1d, who presents for scheduled IOL    FHT reassuring    Active Hospital Problems    Diagnosis  POA    Dichorionic diamniotic twin pregnancy in third trimester [O30.043]  Yes      Resolved Hospital Problems   No resolved problems to display.     Labor course:  2330: -3  0145: 2, AROM clr, pit @ 4 mU/min       PLAN:    Continue Close Maternal/Fetal Monitoring  Pitocin Augmentation per protocol  Recheck 4 hours or PRN    Rebecca Estrada MD  OB/GYN  PGY-3    "

## 2020-11-12 LAB
BASOPHILS NFR BLD: 0 % (ref 0–1.9)
DIFFERENTIAL METHOD: ABNORMAL
EOSINOPHIL NFR BLD: 0 % (ref 0–8)
ERYTHROCYTE [DISTWIDTH] IN BLOOD BY AUTOMATED COUNT: 13.7 % (ref 11.5–14.5)
HCT VFR BLD AUTO: 28.4 % (ref 37–48.5)
HGB BLD-MCNC: 9.2 G/DL (ref 12–16)
IMM GRANULOCYTES # BLD AUTO: ABNORMAL K/UL (ref 0–0.04)
IMM GRANULOCYTES NFR BLD AUTO: ABNORMAL % (ref 0–0.5)
LYMPHOCYTES NFR BLD: 5 % (ref 18–48)
MCH RBC QN AUTO: 27.5 PG (ref 27–31)
MCHC RBC AUTO-ENTMCNC: 32.4 G/DL (ref 32–36)
MCV RBC AUTO: 85 FL (ref 82–98)
METAMYELOCYTES NFR BLD MANUAL: 1 %
MONOCYTES NFR BLD: 9 % (ref 4–15)
MYELOCYTES NFR BLD MANUAL: 4 %
NEUTROPHILS NFR BLD: 80 % (ref 38–73)
NEUTS BAND NFR BLD MANUAL: 1 %
NRBC BLD-RTO: 0 /100 WBC
PLATELET # BLD AUTO: 267 K/UL (ref 150–350)
PLATELET BLD QL SMEAR: ABNORMAL
PMV BLD AUTO: 9.6 FL (ref 9.2–12.9)
POLYCHROMASIA BLD QL SMEAR: ABNORMAL
RBC # BLD AUTO: 3.34 M/UL (ref 4–5.4)
WBC # BLD AUTO: 23.68 K/UL (ref 3.9–12.7)

## 2020-11-12 PROCEDURE — 99231 SBSQ HOSP IP/OBS SF/LOW 25: CPT | Mod: ,,, | Performed by: OBSTETRICS & GYNECOLOGY

## 2020-11-12 PROCEDURE — 85027 COMPLETE CBC AUTOMATED: CPT

## 2020-11-12 PROCEDURE — 99231 PR SUBSEQUENT HOSPITAL CARE,LEVL I: ICD-10-PCS | Mod: ,,, | Performed by: OBSTETRICS & GYNECOLOGY

## 2020-11-12 PROCEDURE — 63600175 PHARM REV CODE 636 W HCPCS: Performed by: STUDENT IN AN ORGANIZED HEALTH CARE EDUCATION/TRAINING PROGRAM

## 2020-11-12 PROCEDURE — 25000003 PHARM REV CODE 250: Performed by: OBSTETRICS & GYNECOLOGY

## 2020-11-12 PROCEDURE — 85007 BL SMEAR W/DIFF WBC COUNT: CPT

## 2020-11-12 PROCEDURE — 36415 COLL VENOUS BLD VENIPUNCTURE: CPT

## 2020-11-12 PROCEDURE — 25000003 PHARM REV CODE 250: Performed by: STUDENT IN AN ORGANIZED HEALTH CARE EDUCATION/TRAINING PROGRAM

## 2020-11-12 PROCEDURE — 11000001 HC ACUTE MED/SURG PRIVATE ROOM

## 2020-11-12 RX ORDER — HYDROCODONE BITARTRATE AND ACETAMINOPHEN 5; 325 MG/1; MG/1
1 TABLET ORAL EVERY 4 HOURS PRN
Status: DISCONTINUED | OUTPATIENT
Start: 2020-11-12 | End: 2020-11-13

## 2020-11-12 RX ORDER — HYDROCODONE BITARTRATE AND ACETAMINOPHEN 10; 325 MG/1; MG/1
1 TABLET ORAL EVERY 4 HOURS PRN
Status: DISCONTINUED | OUTPATIENT
Start: 2020-11-12 | End: 2020-11-13

## 2020-11-12 RX ORDER — HYDROCODONE BITARTRATE AND ACETAMINOPHEN 5; 325 MG/1; MG/1
1 TABLET ORAL EVERY 4 HOURS PRN
Qty: 20 TABLET | Refills: 0 | Status: SHIPPED | OUTPATIENT
Start: 2020-11-12

## 2020-11-12 RX ORDER — IBUPROFEN 600 MG/1
600 TABLET ORAL EVERY 6 HOURS
Qty: 30 TABLET | Refills: 1 | Status: SHIPPED | OUTPATIENT
Start: 2020-11-12

## 2020-11-12 RX ADMIN — OXYCODONE 10 MG: 5 TABLET ORAL at 12:11

## 2020-11-12 RX ADMIN — CLINDAMYCIN IN 5 PERCENT DEXTROSE 900 MG: 18 INJECTION, SOLUTION INTRAVENOUS at 08:11

## 2020-11-12 RX ADMIN — DIPHENHYDRAMINE HYDROCHLORIDE 25 MG: 25 CAPSULE ORAL at 05:11

## 2020-11-12 RX ADMIN — DIPHENHYDRAMINE HYDROCHLORIDE 25 MG: 25 CAPSULE ORAL at 01:11

## 2020-11-12 RX ADMIN — HYDROCODONE BITARTRATE AND ACETAMINOPHEN 1 TABLET: 10; 325 TABLET ORAL at 05:11

## 2020-11-12 RX ADMIN — ENOXAPARIN SODIUM 40 MG: 40 INJECTION SUBCUTANEOUS at 09:11

## 2020-11-12 RX ADMIN — SIMETHICONE 80 MG: 80 TABLET, CHEWABLE ORAL at 08:11

## 2020-11-12 RX ADMIN — AMPICILLIN SODIUM 2 G: 2 INJECTION, POWDER, FOR SOLUTION INTRAMUSCULAR; INTRAVENOUS at 08:11

## 2020-11-12 RX ADMIN — OXYCODONE 10 MG: 5 TABLET ORAL at 01:11

## 2020-11-12 RX ADMIN — HYDROCODONE BITARTRATE AND ACETAMINOPHEN 1 TABLET: 10; 325 TABLET ORAL at 10:11

## 2020-11-12 RX ADMIN — ENOXAPARIN SODIUM 40 MG: 40 INJECTION SUBCUTANEOUS at 08:11

## 2020-11-12 RX ADMIN — OXYCODONE 10 MG: 5 TABLET ORAL at 08:11

## 2020-11-12 RX ADMIN — DOCUSATE SODIUM 200 MG: 100 CAPSULE, LIQUID FILLED ORAL at 09:11

## 2020-11-12 RX ADMIN — SIMETHICONE 80 MG: 80 TABLET, CHEWABLE ORAL at 01:11

## 2020-11-12 RX ADMIN — DOCUSATE SODIUM 200 MG: 100 CAPSULE, LIQUID FILLED ORAL at 08:11

## 2020-11-12 RX ADMIN — DIPHENHYDRAMINE HYDROCHLORIDE 25 MG: 25 CAPSULE ORAL at 08:11

## 2020-11-12 RX ADMIN — KETOROLAC TROMETHAMINE 30 MG: 30 INJECTION, SOLUTION INTRAMUSCULAR at 01:11

## 2020-11-12 RX ADMIN — ACETAMINOPHEN 650 MG: 325 TABLET, FILM COATED ORAL at 12:11

## 2020-11-12 RX ADMIN — CLINDAMYCIN IN 5 PERCENT DEXTROSE 900 MG: 18 INJECTION, SOLUTION INTRAVENOUS at 12:11

## 2020-11-12 RX ADMIN — AMPICILLIN SODIUM 2 G: 2 INJECTION, POWDER, FOR SOLUTION INTRAMUSCULAR; INTRAVENOUS at 01:11

## 2020-11-12 RX ADMIN — ACETAMINOPHEN 650 MG: 325 TABLET, FILM COATED ORAL at 06:11

## 2020-11-12 RX ADMIN — GENTAMICIN SULFATE 435.6 MG: 40 INJECTION, SOLUTION INTRAMUSCULAR; INTRAVENOUS at 10:11

## 2020-11-12 RX ADMIN — KETOROLAC TROMETHAMINE 30 MG: 30 INJECTION, SOLUTION INTRAMUSCULAR at 02:11

## 2020-11-12 RX ADMIN — AMPICILLIN SODIUM 2 G: 2 INJECTION, POWDER, FOR SOLUTION INTRAMUSCULAR; INTRAVENOUS at 02:11

## 2020-11-12 RX ADMIN — KETOROLAC TROMETHAMINE 30 MG: 30 INJECTION, SOLUTION INTRAMUSCULAR at 08:11

## 2020-11-12 RX ADMIN — DIPHENHYDRAMINE HYDROCHLORIDE 25 MG: 25 CAPSULE ORAL at 10:11

## 2020-11-12 RX ADMIN — MUPIROCIN: 20 OINTMENT TOPICAL at 09:11

## 2020-11-12 RX ADMIN — PRENATAL VIT W/ FE FUMARATE-FA TAB 27-0.8 MG 1 TABLET: 27-0.8 TAB at 08:11

## 2020-11-12 RX ADMIN — CLINDAMYCIN IN 5 PERCENT DEXTROSE 900 MG: 18 INJECTION, SOLUTION INTRAVENOUS at 04:11

## 2020-11-12 RX ADMIN — IBUPROFEN 600 MG: 600 TABLET, FILM COATED ORAL at 07:11

## 2020-11-12 NOTE — OPERATIVE NOTE ADDENDUM
Certification of Assistant at Surgery       Surgery Date: 2020     Participating Surgeons:  Surgeon(s) and Role:     * Radha Arenas DO - Primary  Sneha Del Valle MD - Assisting (for critical portion of the procedure which was delivery of the twins)    Procedures:  Procedure(s) (LRB):   SECTION (N/A)    Assistant Surgeon's Certification of Necessity:  I understand that section 1842 (b) (6) (d) of the Social Security Act generally prohibits Medicare Part B reasonable charge payment for the services of assistants at surgery in [facility] hospitals when qualified residents are available to furnish such services. I certify that the services for which payment is claimed were medically necessary, and that no qualified resident was available to perform the services. I further understand that these services are subject to post-payment review by the Medicare carrier.      Sneha Del Valle MD    2020  7:17 PM

## 2020-11-12 NOTE — L&D DELIVERY NOTE
Section Operative Note  Procedure Date: 20    Procedure:   1. Repeat  Section via Pfannanstiel skin incision  2. Bilateral tubal ligation via modified [place] method    Indications:  1. Failed induction of labor  2. Failed attempted TOLAC  3. History of  section x1     Pre-operative Diagnosis:  1. IUP at 38w1d  2. Di/di twin pregnancy  3. History of  section secondary to arrest of dilation  4. History of trichomonas  5. Mild intermittent asthma  6. GBS positive status  7. Anemia  8. History of migraines     Post-operative Diagnosis:  1. As above  2. Status post repeat low transverse  section and bilateral tubal ligation     Surgeon: Radha Arenas DO    Assistants:   Sneha Del Valle MD (as no qualified resident was available at the critical portion of the case)  Rebecca Estrada, PGY3    Anesthesia: epidural     Findings:   1. Viable female (Baby A) infant in cephalic presentation, viable male (Baby B) in cephalic position   2. Normal appearing placentas, discarded  3. Anatomically normal appearing uterus, bilateral tubes and ovaries    Estimated Blood Loss: 1000 mL           UOP: 100 mL    Specimens: viable male and female infant, Placentas x2, portions of left and right tube    PreOp CBC:   Lab Results   Component Value Date    WBC 23.68 (H) 2020    HGB 9.2 (L) 2020    HCT 28.4 (L) 2020    MCV 85 2020     2020                     Complications:  None; patient tolerated the procedure well.           Disposition: PACU - hemodynamically stable.           Condition: stable    Procedure Details   Patient initially presented for scheduled induction. She progressed to active labor however after approximately 10 hours, did not unfortunately make cervical change past 6 cm. Given di/di twin pregnancy on setting of history of CS x1 and above findings, decision was made to proceed with repeat low transverse  section as mode of  delivery. The risks, benefits, complications, treatment options, and expected outcomes were discussed with the patient.  The patient concurred with the proposed plan, giving informed consent.  The site of surgery properly noted/marked. The patient was taken to Operating Room, identified as Liliya Gaston and the procedure verified as  Delivery. A Time Out was held and the above information confirmed.    After induction of anesthesia, the patient was prepped and draped in the usual sterile manner while placed in a dorsal supine position with a left lateral tilt.  A bullard catheter was already present given previous induction. Vaginal prep was also performed by resident. Preoperative antibiotics were administered and an allis test was performed yielding adequate anesthesia.  A Pfannenstiel incision was made and carried down through the subcutaneous tissue to the fascia. Multiple areas of oozing were noted in the subcutaneous tissue requiring use of Bovie. Hemostasis was noted after cauterization.   Fascial incision was made and extended transversely. The fascia was grasped with Kocher clamps and  from the underlying rectus tissue superiorly and inferiorly with some difficulty due to dense adhesions along the anterior abdominal wall. There was some difficulty discerning tissue planes and second staff was called to the room for assistance. The peritoneum was identified, found to be free of adherent bowel and entered with use of blunt finger dissection. Peritoneal incision was extended longitudinally. Additional room was made laterally with use of Bovie cautery. The vesico-uterine peritoneum was identified and bladder blade was inserted. A low transverse uterine incision was made with knife and extended with cephalocaudal traction. The amniotic sac of Baby A was ruptured with hemostats and the infant was noted to be in cephalic position. The head was brought to the incision and elevated out of the  pelvis. The patient delivered a viable female infant without difficulty.  Infant weighed 3660 grams with Apgar scores of 8/9 at one and five minutes respectively. After the umbilical cord was clamped and cut cord blood was obtained for evaluation. Attention was then turned to Baby B. Staff confirmed position of Baby B as cephalic and head was brought to the incision and was elevated out of of the pelvis. The patient delivered a viable male infant without difficulty. The infant weighed 3070 grams with Apgar scores of 7/9 at one and five minutes respectively. The umbilical cord was clamped and cut cord blood was obtained for evaluation. Both placentas were removed intact and appeared normal and was discarded. The uterus was exteriorized. The uterine outline, tubes and ovaries appeared normal. The uterine incision was closed with running locked sutures of 1-0 chromic. Hemostasis was observed.      Patient was airplaned to the right and the left tube was identified, grasped with Donny clamps and walked out to the fimbriae which was identified. The tube was then grasped at the mid isthmic portion. 0 Cat gut was placed through the mesosalpinx and tied around the distal end of the tube. In a similar fashion, another piece of suture was tied below the previous suture. This was repeated on the proximal portion of the tube. Metzenbaums were then used to cut above the suture both proximally and distally. Excellent hemostasis was noted. The cut portion of the tube was sent to Pathology. Attention was then paid to the right tube and in a similar fashion was gasped with Babcocks, walked to fimbriae. The midisthmic portion was grasped with donny and 0 chromic suture was placed through the mesosalpinx and tied around the distal end of the tube. In a similar fashion, another piece of suture was tied below the previous suture. This was repeated on the proximal portion of the tube. Metzenbaums were then used to cut above the  suture both proximally and distally. Excellent hemostasis was noted.    The uterus was returned to the abdominal cavity. Incision was reinspected and good hemostasis was noted. The abdominal cavity was irrigated to remove clots. Bilateral tubes were visualized without evidence of bleeding. Previous portion of muscle which was partially Bovied for additional room to aid in fetal delivery was re approximated with 0 chromic. The fascia was then reapproximated with running sutures of 1-0 PDS. The subcutaneous fat was reapproximated with 2-0 vicryl. The skin was reapproximated with 4-0 monocryl in subcuticular fashion.    Instrument, sponge, and needle counts were correct prior the abdominal closure and at the conclusion of the case.     Pt tolerated procedure well and was taken to recovery in stable condition    Rebecca Estrada MD  OB/GYN  PGY-3            Alek, A Girl Jameondria [[id number]]     Delivery Information for A Selene Gaston    Birth information:  YOB: 2021   Time of birth: 5:49 PM   Sex: female   Head Delivery Date/Time: 2020  5:49 PM   Delivery type: , Low Transverse   Gestational Age: 38w1d    Delivery Providers    Delivering clinician: Radha Arenas DO   Provider Role    Rebecca Estrada MD Resident    Keeley Pendleton, SURAJ Registered Nurse    Chela Davidson, RN Registered Nurse    Gustabo Gibbons, RN Registered Nurse    Tracie Andres Surgical Tech    Lorena Bond Surgical Tech            Measurements    Weight: 3660 g  Length:          Apgars    Living status:   Apgars:  1 min.:  5 min.:  10 min.:  15 min.:  20 min.:    Skin color:         Heart rate:         Reflex irritability:         Muscle tone:         Respiratory effort:         Total:                Operative Delivery    Forceps attempted?: No  Vacuum extractor attempted?: No         Shoulder Dystocia    Shoulder dystocia present?: No           Presentation    Presentation: Vertex  Position: Middle  Occiput Anterior           Interventions/Resuscitation    Method: NICU Attended       Cord    Vessels: 3 vessels  Complications: Nuchal  Nuchal Intervention: reduced  Nuchal Cord Description: loose nuchal cord  Number of Loops: 1  Delayed Cord Clamping?: No  Cord Clamped Date/Time: 2020  5:49 PM  Cord Blood Disposition: Sent with Baby  Gases Sent?: Yes       Placenta    Placenta delivery date/time: 2020 1752  Placenta removal: Manual removal  Placenta appearance: Intact  Placenta disposition: pathology           Labor Events:       labor: No     Labor Onset Date/Time:         Dilation Complete Date/Time:         Start Pushing Date/Time:         Start Pushing Date/Time:       Rupture Date/Time: 205         Rupture type:          Fluid Amount:       Fluid Color: Clear      Fluid Odor:       Membrane Status: ARM (Artificial Rupture)               steroids: None     Antibiotics given for GBS: Yes     Induction: balloon dilation (Hood);misoprostol     Indications for induction:  Multiple Gestation;Elective     Augmentation: amniotomy;oxytocin     Indications for augmentation: Ineffective Contraction Pattern     Labor complications: Failure to Progress in First Stage     Additional complications:          Cervical ripening:                     Delivery:      Episiotomy: None     Indication for Episiotomy:       Perineal Lacerations:   Repaired:      Periurethral Laceration:   Repaired:     Labial Laceration:   Repaired:     Sulcus Laceration:   Repaired:     Vaginal Laceration:   Repaired:     Cervical Laceration:   Repaired:     Repair suture:       Repair # of packets:       Last Value - EBL - Nursing (mL):       Sum - EBL - Nursing (mL): 0     Last Value - EBL - Anesthesia (mL):      Calculated QBL (mL):       Vaginal Sweep Performed:       Surgicount Correct:         Other providers:       Anesthesia    Method: Epidural          Details (if applicable):  Trial of Labor  Yes    Categorization: Repeat    Priority: Routine   Indications for : Failure to Progress;Failed ;Multiple Gestation   Incision Type: low transverse     Additional  information:  Forceps:    Vacuum:    Breech:    Observed anomalies    Other (Comments):            JOHN Gaston [[id number]]     Delivery Information for JOHN Gaston    Birth information:  YOB: 2021   Time of birth: 5:50 PM   Sex: male   Head Delivery Date/Time: 2020  5:50 PM   Delivery type: , Low Transverse   Gestational Age: 38w1d    Delivery Providers    Delivering clinician: Radha Arenas DO   Provider Role    Rebecca Estrada MD Resident    Keeley Pendleton, RN Registered Nurse    Chela Davidson, RN Registered Nurse    Lorena Bond Surgical Tech    Tracie Andres Surgical Tech    Gustabo Gibbons, RN Registered Nurse            Measurements    Weight:   Length:          Apgars    Living status: Living  Apgars:  1 min.:  5 min.:  10 min.:  15 min.:  20 min.:    Skin color:         Heart rate:         Reflex irritability:         Muscle tone:         Respiratory effort:         Total:                Operative Delivery    Forceps attempted?: No  Vacuum extractor attempted?: No         Shoulder Dystocia    Shoulder dystocia present?: No           Presentation    Presentation: Vertex  Position: Middle Occiput Anterior           Interventions/Resuscitation    Method: NICU Attended       Cord    Vessels: 3 vessels  Complications: None  Delayed Cord Clamping?: No  Cord Clamped Date/Time: 2020  5:50 PM  Cord Blood Disposition: Sent with Baby  Gases Sent?: Yes  Stem Cell Collection (by MD): No       Placenta    Placenta delivery date/time: 2020 1752  Placenta removal: Manual removal  Placenta appearance: Intact  Placenta disposition: pathology           Labor Events:       labor: No     Labor Onset Date/Time:         Dilation Complete Date/Time:          Start Pushing Date/Time:         Start Pushing Date/Time:       Rupture Date/Time: 20  0135         Rupture type:          Fluid Amount:       Fluid Color: Clear      Fluid Odor:       Membrane Status: ARM (Artificial Rupture)               steroids: None     Antibiotics given for GBS: Yes     Induction: misoprostol;balloon dilation (Hood)     Indications for induction:  Multiple Gestation;Elective     Augmentation: oxytocin     Indications for augmentation: Ineffective Contraction Pattern     Labor complications: Failure to Progress in First Stage     Additional complications:          Cervical ripening:                     Delivery:      Episiotomy: None     Indication for Episiotomy:       Perineal Lacerations:   Repaired:      Periurethral Laceration:   Repaired:     Labial Laceration:   Repaired:     Sulcus Laceration:   Repaired:     Vaginal Laceration:   Repaired:     Cervical Laceration:   Repaired:     Repair suture:       Repair # of packets:       Last Value - EBL - Nursing (mL):       Sum - EBL - Nursing (mL): 0     Last Value - EBL - Anesthesia (mL):      Calculated QBL (mL):       Vaginal Sweep Performed:       Surgicount Correct:         Other providers:       Anesthesia    Method: Epidural          Details (if applicable):  Trial of Labor Yes    Categorization: Repeat    Priority: Routine   Indications for : Failed ;Failure to Progress;Multiple Gestation   Incision Type: low transverse     Additional  information:  Forceps:    Vacuum:    Breech:    Observed anomalies    Other (Comments):

## 2020-11-12 NOTE — PROGRESS NOTES
11/11/20 1924   Reproductive Interventions   Breast Care: Breastfeeding open to air   Breastfeeding Assistance assisted with positioning;feeding on demand promoted;feeding cue recognition promoted;infant latch-on verified;feeding session observed;support offered   Basic Breastfeeding Instructions     The more you nurse the baby the more milk you will make.   Avoid bottles and pacifiers for the first 4 weeks.   Feed your baby only breastmik for the first 6 months.   Feed your baby at the earliest sign of hunger or comfort:  o Sucking on fingers or hands  o Bringing hands toward his mouth  o Rooting or reaching for something to suck on  o Sucking motions with mouth  o Fretful noises  o Crying is a late sign of hunger or comfort.   The baby should be positioned and latched on to the breast correctly  o Chest-to-chest, chin in the breast  o Babys lips are flipped outward  o Babys mouth is stretched open wide like a shout  o Babys sucking should feel like tugging to the mother  - The baby should be drinking at the breast  o You should hear an occasional swallow during the feeding  o Switch breasts when the baby takes himself off the breast or falls asleep  o Keep offering breasts until the baby looks full, no longer gives hunger signs, and stays asleep when placed on his back in the crib  - If the baby is sleepy and wont wake for a feeding, put the baby skin-to-skin dressed in a diaper against the mothers bare chest  - Sleep with your baby near you in the hospital room  - Call the nurse for additional assistance as needed.    Mother was taught hand expression of breastmilk/colostrum. She was instructed to:   Sit upright and lean forward, if possible.   When feasible, apply warm, wet compress over breasts for a few minutes.    Perform gentle breast massage.   Form a C with her hand and place it about 1 inch back from the areola with the nipple centered between her index finger and her  thumb.   Press, compress, relax:  Using her finger and thumb, apply pressure in an inward direction toward the breast without stretching the tissue, compress the breast tissue between her finger and thumb, then relax her finger and thumb. Repeat process for a few minutes.   Rotate placement of finger and thumb on the breasts to facilitate emptying.   Collect expressed breastmilk/colostrum with a spoon or cup and feed immediately to the baby, if able.   If unable to feed immediately, place breastmilk/colostrum directly into a sterile storage container for later use. Place the babys breast milk label (with the date and time of collection and the names of mother's medications) on the container. Reviewed proper handling and storage of expressed breastmilk.   Patient effectively return demonstrated and verbalized understanding.

## 2020-11-12 NOTE — LACTATION NOTE
Reviewed basic lactation education for breastfeeding twins, all questions answered. Mom is breastfeeding and bottle feeding. Mom state twin A nursing well, twin B falls asleep at breast. Mom requested breast pump. Breast pump brought in room but pt declines set up at this time. Instructed mom to call RN or LC when ready for breast pump to be initiated. Encouraged mom to nurse infants separate then utilize breast pump for extra stimulation. Mom to call LC as needed for assistance.

## 2020-11-12 NOTE — PROGRESS NOTES
POSTPARTUM PROGRESS NOTE     Liliya Gaston is a 25 y.o. female POD #1 status post Repeat  section and BTL at 38w1d in a pregnancy complicated by di/di twin pregnancy, h/o csection, h/o of trichomonas, mild intermittent asthma, GBS+, Anemia, MO, h/o migraines.    Patient is doing well this morning. She denies nausea, vomiting, fever or chills. Patient reports mild abdominal pain that is adequately relieved by oral pain medications. Lochia is mild to moderate and stable. Patient is voiding without difficulty and ambulating with no difficulty. She has passed flatus.    Patient does plan to breast feed. S/p BTL for contraception. She desires circumcision for baby boy.     Objective:       Temp:  [95.4 °F (35.2 °C)-99.6 °F (37.6 °C)] 97.7 °F (36.5 °C)  Pulse:  [] 92  Resp:  [18] 18  SpO2:  [94 %-100 %] 98 %  BP: (107-158)/(51-83) 116/59    General:   alert, appears stated age and cooperative   Lungs:   Non-labored respirations    Heart:   regular rate and rhythm   Abdomen:  Soft, nondistended    Uterus:  firm located at the umbilicus.    Incision: Bandage in place, clean, dry and intact   Extremities: no pedal edema noted     Lab Review  No results found for this or any previous visit (from the past 4 hour(s)).    I/O    Intake/Output Summary (Last 24 hours) at 2020 0653  Last data filed at 2020 0617  Gross per 24 hour   Intake 4579.65 ml   Output 3790 ml   Net 789.65 ml        Assessment:     Patient Active Problem List   Diagnosis    Supervision of normal pregnancy    Asthma complicating pregnancy, antepartum    Obesity complicating pregnancy, third trimester    Dichorionic diamniotic twin pregnancy in third trimester    Acute bilateral low back pain without sciatica    Decreased functional mobility and endurance    Echogenic intracardiac focus of fetus on prenatal ultrasound    Status post  delivery    Positive GBS test        Plan:   1. Postpartum care:  - Patient  doing well. Continue routine management and advances.  - Continue PO pain meds. Pain well controlled.  - Heme: H/h 12/37 >>> AM CBC pending  - Encourage ambulation  - Circumcision consents signed, orders placed  - Contraception to be discussed at 6 week pp visit  - Lactation consult PRN  - Rh +    2. History of trichomonas in T1  - Detected on 04/2020  - Negative STARLA on 08/11/2020     3.  Mild intermittent asthma  - Asymptomatic  - SpO2:  [94 %-100 %] 98 %  - Has not used any albuterol this pregnancy, no history of ICU admissions or intubations  - Monitor closely      4. Anemia  - Asymptomatic  - Admit CBC: 12/37  - High post partum hemorrhage risk, continue to monitor closely   - AM CBC pending     5. History of migraines  - Stable, asymptomatic  - Fioricet PRN  - Monitor blood pressures closely     6. MO (BMI 57)   - Lovenox in post partum period as patient's pre pregnancy BMI was >45    7. Maternal/fetal tachycardia  - Noted during delivery  - clinical signs of infection however no recorded fever  - Patient started on amp/gent/clinda @2100 on 11/11  - Will continue abx for at least 24 hours      Dispo: As patient meets milestones, will plan to discharge POD #3-4.      Corinna Barrios M.D.  OB/GYN PGY-1

## 2020-11-12 NOTE — ANESTHESIA POSTPROCEDURE EVALUATION
Anesthesia Post Evaluation    Patient: Liliya Gaston    Procedure(s) Performed: Procedure(s) (LRB):   SECTION (N/A)    Final Anesthesia Type: epidural    Patient location during evaluation: labor & delivery  Patient participation: Yes- Able to Participate  Level of consciousness: awake and alert  Post-procedure vital signs: reviewed and stable  Pain management: adequate  Airway patency: patent  EAGLE mitigation strategies: Use of major conduction anesthesia (spinal/epidural) or peripheral nerve block and Multimodal analgesia  PONV status at discharge: No PONV  Anesthetic complications: no      Cardiovascular status: blood pressure returned to baseline and hemodynamically stable  Respiratory status: unassisted, spontaneous ventilation and room air  Hydration status: euvolemic  Follow-up not needed.          Vitals Value Taken Time   /73 20   Temp 36.3 °C (97.4 °F) 20   Pulse 87 20   Resp 18 20   SpO2 95 % 20         Event Time   Out of Recovery 2020 21:35:00         Pain/Deborah Score: Pain Rating Prior to Med Admin: 5 (2020  8:30 AM)  Pain Rating Post Med Admin: 4 (2020  9:10 AM)

## 2020-11-12 NOTE — DISCHARGE INSTRUCTIONS
Breastfeeding Discharge Instructions       Feed the baby at the earliest sign of hunger or comfort  o Hands to mouth, sucking motions  o Rooting or searching for something to suck on  o Dont wait for crying - it is a sign of distress     The feedings may be 8-12 times per 24hrs and will not follow a schedule   Avoid pacifiers and bottles for the first 4 weeks   Alternate the breast you start the feeding with, or start with the breast that feels the fullest   Switch breasts when the baby takes himself off the breast or falls asleep   Keep offering breasts until the baby looks full, no longer gives hunger signs, and stays asleep when placed on his back in the crib   If the baby is sleepy and wont wake for a feeding, put the baby skin-to-skin dressed in a diaper against the mothers bare chest   Sleep near your baby   The baby should be positioned and latched on to the breast correctly  o Chest-to-chest, chin in the breast  o Babys lips are flipped outward  o Babys mouth is stretched open wide like a shout  o Babys sucking should feel like tugging to the mother  - The baby should be drinking at the breast:  o You should hear swallowing or gulping throughout the feeding  o You should see milk on the babys lips when he comes off the breast  o Your breasts should be softer when the baby is finished feeding  o The baby should look relaxed at the end of feedings  o After the 4th day and your milk is in:  o The babys poop should turn bright yellow and be loose, watery, and seedy  o The baby should have at least 3-4 poops the size of the palm of your hand per day  o The baby should have at least 5-6 wet diapers per day  o The urine should be light yellow in color  You should drink when you are thirsty and eat a healthy diet when you are    hungry.     Take naps to get the rest you need.   Take medications and/or drink alcohol only with permission of your obstetrician    or the babys pediatrician.  You can  also call the [facility],   ([phone]), Monday-Friday, 8am-5pm Central time, to get the most   up-to-date evidence-based information on the use of medications during   pregnancy and breastfeeding.      The baby should be examined by a pediatrician at 3-5 days of age.  Once your   milk comes in, the baby should be gaining at least ½ - 1oz each day and should be back to birthweight no later than 10-14 days of age.          Community Resources    [facility] Breastfeeding Warmline: [phone]   Local [facility] clinics: provide incentives and breastpumps to eligible mothers  [employer] (LLLI):  mother-to-mother support group website        www.mPortl.[employer]  Local La Leche League mother-to-mother support groups:        www.[employer]        La Leche League [facility]   Dr. Buddy Lai website for latch videos and general information:        www.breastfeedinginc.ca  [facility] is a call center that provides information about the safety of taking medications while breastfeeding.  Call [phone], M-F, 8am-5pm, CT.  International Lactation Consultant Association provides resources for assistance:        www.ilca.org  [place] Breastfeeding Coalition provides informationand resources for parents  and the community    www.LaBreastfeedingSupport.org     Brianna Trevizo is a mom-to-mom support group:                             www.[employer].com//breastfeedng-support/  [employer] for Healthy Babies:  7-459-906-BABY(9470)  [employer]: a breastfeeding support group for women of color, [phone]

## 2020-11-13 ENCOUNTER — TELEPHONE (OUTPATIENT)
Dept: OBSTETRICS AND GYNECOLOGY | Facility: CLINIC | Age: 25
End: 2020-11-13

## 2020-11-13 PROCEDURE — 94640 AIRWAY INHALATION TREATMENT: CPT

## 2020-11-13 PROCEDURE — 99232 PR SUBSEQUENT HOSPITAL CARE,LEVL II: ICD-10-PCS | Mod: ,,, | Performed by: OBSTETRICS & GYNECOLOGY

## 2020-11-13 PROCEDURE — 63600175 PHARM REV CODE 636 W HCPCS: Performed by: STUDENT IN AN ORGANIZED HEALTH CARE EDUCATION/TRAINING PROGRAM

## 2020-11-13 PROCEDURE — 25000003 PHARM REV CODE 250: Performed by: OBSTETRICS & GYNECOLOGY

## 2020-11-13 PROCEDURE — 25000003 PHARM REV CODE 250: Performed by: STUDENT IN AN ORGANIZED HEALTH CARE EDUCATION/TRAINING PROGRAM

## 2020-11-13 PROCEDURE — 99232 SBSQ HOSP IP/OBS MODERATE 35: CPT | Mod: ,,, | Performed by: OBSTETRICS & GYNECOLOGY

## 2020-11-13 PROCEDURE — 11000001 HC ACUTE MED/SURG PRIVATE ROOM

## 2020-11-13 PROCEDURE — 25000242 PHARM REV CODE 250 ALT 637 W/ HCPCS: Performed by: STUDENT IN AN ORGANIZED HEALTH CARE EDUCATION/TRAINING PROGRAM

## 2020-11-13 RX ORDER — OXYCODONE AND ACETAMINOPHEN 5; 325 MG/1; MG/1
1 TABLET ORAL EVERY 4 HOURS PRN
Status: DISCONTINUED | OUTPATIENT
Start: 2020-11-13 | End: 2020-11-14 | Stop reason: HOSPADM

## 2020-11-13 RX ORDER — ALBUTEROL SULFATE 90 UG/1
2 AEROSOL, METERED RESPIRATORY (INHALATION) EVERY 8 HOURS
Status: DISCONTINUED | OUTPATIENT
Start: 2020-11-13 | End: 2020-11-14

## 2020-11-13 RX ORDER — OXYCODONE AND ACETAMINOPHEN 10; 325 MG/1; MG/1
1 TABLET ORAL EVERY 4 HOURS PRN
Status: DISCONTINUED | OUTPATIENT
Start: 2020-11-13 | End: 2020-11-14 | Stop reason: HOSPADM

## 2020-11-13 RX ORDER — SIMETHICONE 80 MG
2 TABLET,CHEWABLE ORAL EVERY 6 HOURS PRN
Status: DISCONTINUED | OUTPATIENT
Start: 2020-11-13 | End: 2020-11-14 | Stop reason: HOSPADM

## 2020-11-13 RX ADMIN — ENOXAPARIN SODIUM 40 MG: 40 INJECTION SUBCUTANEOUS at 08:11

## 2020-11-13 RX ADMIN — OXYCODONE HYDROCHLORIDE AND ACETAMINOPHEN 1 TABLET: 10; 325 TABLET ORAL at 09:11

## 2020-11-13 RX ADMIN — HYDROCODONE BITARTRATE AND ACETAMINOPHEN 1 TABLET: 10; 325 TABLET ORAL at 05:11

## 2020-11-13 RX ADMIN — ALBUTEROL SULFATE 2 PUFF: 90 AEROSOL, METERED RESPIRATORY (INHALATION) at 11:11

## 2020-11-13 RX ADMIN — IBUPROFEN 600 MG: 600 TABLET, FILM COATED ORAL at 06:11

## 2020-11-13 RX ADMIN — HYDROCODONE BITARTRATE AND ACETAMINOPHEN 1 TABLET: 10; 325 TABLET ORAL at 10:11

## 2020-11-13 RX ADMIN — HYDROCODONE BITARTRATE AND ACETAMINOPHEN 1 TABLET: 10; 325 TABLET ORAL at 02:11

## 2020-11-13 RX ADMIN — HYDROCODONE BITARTRATE AND ACETAMINOPHEN 1 TABLET: 10; 325 TABLET ORAL at 06:11

## 2020-11-13 RX ADMIN — SIMETHICONE 160 MG: 80 TABLET, CHEWABLE ORAL at 04:11

## 2020-11-13 RX ADMIN — IBUPROFEN 600 MG: 600 TABLET, FILM COATED ORAL at 11:11

## 2020-11-13 RX ADMIN — ALBUTEROL SULFATE 2 PUFF: 90 AEROSOL, METERED RESPIRATORY (INHALATION) at 07:11

## 2020-11-13 RX ADMIN — IBUPROFEN 600 MG: 600 TABLET, FILM COATED ORAL at 05:11

## 2020-11-13 RX ADMIN — DOCUSATE SODIUM 200 MG: 100 CAPSULE, LIQUID FILLED ORAL at 08:11

## 2020-11-13 RX ADMIN — SIMETHICONE 160 MG: 80 TABLET, CHEWABLE ORAL at 09:11

## 2020-11-13 RX ADMIN — DIPHENHYDRAMINE HYDROCHLORIDE 25 MG: 25 CAPSULE ORAL at 11:11

## 2020-11-13 RX ADMIN — PRENATAL VIT W/ FE FUMARATE-FA TAB 27-0.8 MG 1 TABLET: 27-0.8 TAB at 08:11

## 2020-11-13 RX ADMIN — CLINDAMYCIN IN 5 PERCENT DEXTROSE 900 MG: 18 INJECTION, SOLUTION INTRAVENOUS at 04:11

## 2020-11-13 RX ADMIN — AMPICILLIN SODIUM 2 G: 2 INJECTION, POWDER, FOR SOLUTION INTRAMUSCULAR; INTRAVENOUS at 02:11

## 2020-11-13 RX ADMIN — SIMETHICONE 80 MG: 80 TABLET, CHEWABLE ORAL at 08:11

## 2020-11-13 NOTE — PHYSICIAN QUERY
PT Name: Liliya Gaston  MR #: [id number]    HEMATOLOGY CLARIFICATION      CDS/: DARIA [place]N,RNC-MNN       Contact information:[email or web address]    This form is a permanent document in the medical record.      Query Date: 2020    By submitting this query, we are merely seeking further clarification of documentation. Please utilize your independent clinical judgment when addressing the question(s) below.    The Medical Record contains the following:   Indicators  Supporting Clinical Findings Location in Medical Record   X Anemia documented Anemia OB Progress note @652am   X H&H Hgb=11.9-->9.2  Hct=36.6-->28.4 LAB 11/10-    BP                    HR      GI bleeding documented     X Acute bleeding (Non GI site) Calculated QBL (Quantitative Blood Loss) (mL) 1515 QBL Calculator     Transfusion(s)     X Acute/Chronic illness status post Repeat  section and BTL at 38w1d in a pregnancy complicated by di/di twin pregnancy, h/o csection, h/o of trichomonas, mild intermittent asthma, GBS+, Anemia, MO, h/o migraines. OB Progress note @652am    Treatments      Other       Provider, please specify diagnosis or diagnoses associated with above clinical findings.   [   ] Acute blood loss anemia    [  x ] Acute blood loss anemia expected post-operatively    [   ] Other Hematological Diagnosis (please specify): _________________   [   ] Clinically Undetermined     Present on admission (POA) status:   [   ] Yes (Y)   [ x  ] No (N)   [   ] Documentation insufficient to determine if condition is POA (U)   [  ] Clinically Undetermined (W)          Please document in your progress notes daily for the duration of treatment, until resolved, and include in your discharge summary.    Form No. [id number]

## 2020-11-13 NOTE — PROGRESS NOTES
POSTPARTUM PROGRESS NOTE     Liliya Gaston is a 25 y.o. female POD #2 status post Repeat  section and BTL at 38w1d in a pregnancy complicated by di/di twin pregnancy, h/o csection, h/o of trichomonas, mild intermittent asthma, GBS+, Anemia, MO, h/o migraines.    Patient is doing well this morning. She denies nausea, vomiting, fever or chills. Patient reports moderate abdominal pain that is moderately relieved by oral pain medications. Lochia is mild to moderate and stable. Patient is voiding without difficulty and ambulating with no difficulty. She has passed flatus.    Patient does plan to breast feed. S/p BTL for contraception. She desires circumcision for baby boy.     Objective:       Temp:  [97.4 °F (36.3 °C)-98.4 °F (36.9 °C)] 97.7 °F (36.5 °C)  Pulse:  [87-95] 95  Resp:  [16-18] 18  SpO2:  [95 %-99 %] 95 %  BP: (107-132)/(50-73) 117/60    General:   alert, appears stated age and cooperative   Lungs:   Non-labored respirations    Heart:   regular rate and rhythm   Abdomen:  Soft, nondistended    Uterus:  firm located at the umbilicus.    Incision: Bandage in place, clean, dry and intact   Extremities: no pedal edema noted     Lab Review  No results found for this or any previous visit (from the past 4 hour(s)).    I/O    Intake/Output Summary (Last 24 hours) at 2020 0649  Last data filed at 2020 1600  Gross per 24 hour   Intake --   Output 1000 ml   Net -1000 ml        Assessment:     Patient Active Problem List   Diagnosis    Supervision of normal pregnancy    Asthma complicating pregnancy, antepartum    Obesity complicating pregnancy, third trimester    Dichorionic diamniotic twin pregnancy in third trimester    Acute bilateral low back pain without sciatica    Decreased functional mobility and endurance    Echogenic intracardiac focus of fetus on prenatal ultrasound    Status post  delivery    Positive GBS test        Plan:   1. Postpartum care:  - Patient doing  well. Continue routine management and advances.  - Continue PO pain meds. Pain well controlled.  - Heme: H/h 12/37 >>> 9/28  - Encourage ambulation  - Circumcision consents signed, orders placed  - Contraception to be discussed at 6 week pp visit  - Lactation consult PRN  - Rh +    2. History of trichomonas in T1  - Detected on 04/2020  - Negative STARLA on 08/11/2020     3.  Mild intermittent asthma  - Asymptomatic  - SpO2:  [95 %-99 %] 95 %  - Has not used any albuterol this pregnancy, no history of ICU admissions or intubations  - Monitor closely      4. Anemia  - Asymptomatic  - H/H as above     5. History of migraines  - Stable, asymptomatic  - Fioricet PRN  - Monitor blood pressures closely     6. MO (BMI 57)   - Lovenox in post partum period as patient's pre pregnancy BMI was >45    7. Maternal/fetal tachycardia  - Noted during delivery  - clinical signs of infection however no recorded fever  - s/p amp/gent/clinda    Dispo: As patient meets milestones, will plan to discharge POD #3-4.    Abena Rivers MD/MPH  OB/GYN PGY1

## 2020-11-13 NOTE — LACTATION NOTE
"   11/13/20 1115   Maternal Assessment   Breast Size Issue   (large)   Breast Shape Bilateral:;pendulous   Breast Density Bilateral:;soft   Areola Bilateral:;elastic   Nipples Bilateral:;everted   Left Nipple Symptoms tender   Right Nipple Symptoms tender   Maternal Infant Feeding   Maternal Emotional State assist needed   Infant Positioning clutch/football   Signs of Milk Transfer infant jaw motion present   Pain with Feeding no   Comfort Measures Before/During Feeding infant position adjusted;latch adjusted;maternal position adjusted   Latch Assistance yes   Breastfeeding Supplementation   Infant Indication for Supplementation maternal request   Breastfeeding Supplementation Type formula   Method of Supplementation bottle   Equipment Type   Breast Pump Type double electric, hospital grade   Breast Pump Flange Type hard   Breast Pump Flange Size 24 mm   Breast Pumping   Breast Pumping Interventions post-feed pumping encouraged   Breast Pumping double electric breast pump utilized   Lactation rounds. Pt states that she feels that infants have been "latching without a problem" LC encouraged pt to call for latch assessment. Pt states that she plans to put the babies to the breast, pump and use bottles. Pt asking LC about using a nipple shield.  Explained that LC needs to assess infant latch prior to pt just using a nipple shield, use and care reviewed. Pt latched infant A without difficulty, football hold, good tugs and pulls, swallows noted with compression. LC explained to pt that her latch technique is good and baby nursing well there is not a indication that she needs to use a nipple shield at this time. Encouraged pt to continue to feed on cue, nurse the baby that cues first , first. Nurse for 15 -20 minuets, let someone else offer first feeder supplement while mother latches and nurses 2nd feeder. Offer supplement, and mom should pump both breast for 20 minuets . Call for assistance as needed.   "

## 2020-11-14 VITALS
DIASTOLIC BLOOD PRESSURE: 61 MMHG | HEART RATE: 86 BPM | BODY MASS INDEX: 53.92 KG/M2 | WEIGHT: 293 LBS | TEMPERATURE: 98 F | OXYGEN SATURATION: 100 % | RESPIRATION RATE: 16 BRPM | HEIGHT: 62 IN | SYSTOLIC BLOOD PRESSURE: 112 MMHG

## 2020-11-14 PROBLEM — O30.043 DICHORIONIC DIAMNIOTIC TWIN PREGNANCY IN THIRD TRIMESTER: Status: RESOLVED | Noted: 2020-04-16 | Resolved: 2020-11-14

## 2020-11-14 PROCEDURE — 94640 AIRWAY INHALATION TREATMENT: CPT

## 2020-11-14 PROCEDURE — 94761 N-INVAS EAR/PLS OXIMETRY MLT: CPT

## 2020-11-14 PROCEDURE — 25000003 PHARM REV CODE 250: Performed by: OBSTETRICS & GYNECOLOGY

## 2020-11-14 PROCEDURE — 99232 PR SUBSEQUENT HOSPITAL CARE,LEVL II: ICD-10-PCS | Mod: ,,, | Performed by: OBSTETRICS & GYNECOLOGY

## 2020-11-14 PROCEDURE — 25000003 PHARM REV CODE 250: Performed by: STUDENT IN AN ORGANIZED HEALTH CARE EDUCATION/TRAINING PROGRAM

## 2020-11-14 PROCEDURE — 63600175 PHARM REV CODE 636 W HCPCS: Performed by: STUDENT IN AN ORGANIZED HEALTH CARE EDUCATION/TRAINING PROGRAM

## 2020-11-14 PROCEDURE — 99232 SBSQ HOSP IP/OBS MODERATE 35: CPT | Mod: ,,, | Performed by: OBSTETRICS & GYNECOLOGY

## 2020-11-14 RX ORDER — ALBUTEROL SULFATE 90 UG/1
2 AEROSOL, METERED RESPIRATORY (INHALATION) EVERY 4 HOURS PRN
Status: DISCONTINUED | OUTPATIENT
Start: 2020-11-14 | End: 2020-11-14 | Stop reason: HOSPADM

## 2020-11-14 RX ADMIN — OXYCODONE HYDROCHLORIDE AND ACETAMINOPHEN 1 TABLET: 10; 325 TABLET ORAL at 09:11

## 2020-11-14 RX ADMIN — ENOXAPARIN SODIUM 40 MG: 40 INJECTION SUBCUTANEOUS at 09:11

## 2020-11-14 RX ADMIN — ALBUTEROL SULFATE 2 PUFF: 90 AEROSOL, METERED RESPIRATORY (INHALATION) at 07:11

## 2020-11-14 RX ADMIN — PRENATAL VIT W/ FE FUMARATE-FA TAB 27-0.8 MG 1 TABLET: 27-0.8 TAB at 09:11

## 2020-11-14 RX ADMIN — OXYCODONE HYDROCHLORIDE AND ACETAMINOPHEN 1 TABLET: 10; 325 TABLET ORAL at 05:11

## 2020-11-14 RX ADMIN — DOCUSATE SODIUM 200 MG: 100 CAPSULE, LIQUID FILLED ORAL at 09:11

## 2020-11-14 RX ADMIN — DIPHENHYDRAMINE HYDROCHLORIDE 25 MG: 25 CAPSULE ORAL at 05:11

## 2020-11-14 RX ADMIN — IBUPROFEN 600 MG: 600 TABLET, FILM COATED ORAL at 05:11

## 2020-11-14 RX ADMIN — OXYCODONE HYDROCHLORIDE AND ACETAMINOPHEN 1 TABLET: 10; 325 TABLET ORAL at 12:11

## 2020-11-14 RX ADMIN — DIPHENHYDRAMINE HYDROCHLORIDE 25 MG: 25 CAPSULE ORAL at 09:11

## 2020-11-14 RX ADMIN — SIMETHICONE 160 MG: 80 TABLET, CHEWABLE ORAL at 03:11

## 2020-11-14 NOTE — LACTATION NOTE
Attempted lactation discharge education, mom answered phone call as teaching being completed. Grandmother in room states things are going well. Instructed grandmother to have pt continue on plan of nursing, pumping and supplementing infants, v/u. Encouraged to call LC as needed.

## 2020-11-14 NOTE — DISCHARGE SUMMARY
Delivery Discharge Summary  Obstetrics      Primary OB Clinician: Radha Arenas DO     Admission date: 11/10/2020  Discharge date: 2020    Disposition: To home, self care    Discharge Diagnosis List:      Patient Active Problem List   Diagnosis    Supervision of normal pregnancy    Asthma complicating pregnancy, antepartum    Obesity complicating pregnancy, third trimester    Acute bilateral low back pain without sciatica    Decreased functional mobility and endurance    Echogenic intracardiac focus of fetus on prenatal ultrasound    Status post  delivery    Positive GBS test       Procedure: RUST    Hospital Course:  Liliya Gaston is a 25 y.o. now , POD #3 who was admitted on 11/10/2020 at 38w0d for scheduled IOL of di-di twins. Patient was subsequently admitted to labor and delivery unit with signed consents.     Labor course was complicated by arrest of dilation at 6cm, and decision was made to proceed with delivery via  which was performed without complications.    Please see delivery note for further details. Her postpartum course was uncomplicated. On discharge day, patient's pain is controlled with oral pain medications. Pt is tolerating ambulation without SOB or CP, and regular diet without N/V. Reports lochia is mild. Denies any HA, vision changes, F/C, LE swelling. Denies any breast pain/soreness.    Pt in stable condition and ready for discharge. She has been instructed to start and/or continue medications and follow up with her obstetrics provider as listed below.    Pertinent studies:  CBC  Recent Labs   Lab 11/10/20  1858  20  1816 20  1819 20  0746   WBC 7.82  --   --   --  23.68*   HGB 11.9*  --   --   --  9.2*   HCT 36.6*   < > 53 53 28.4*   MCV 84  --   --   --  85     --   --   --  267    < > = values in this interval not displayed.          Immunization History   Administered Date(s) Administered    Influenza -  Quadrivalent - PF *Preferred* (6 months and older) 10/23/2020    Tdap 2015, 10/23/2020           JUAN JOSE Gaston Girl Jameondria [[id number]]     Delivery:    Episiotomy: None   Lacerations:     Repair suture:     Repair # of packets:     Blood loss (ml):       Birth information:  YOB: 2021   Time of birth: 5:49 PM   Sex: female   Delivery type: , Low Transverse   Gestational Age: 38w1d    Delivery Clinician:      Other providers:       Additional  information:  Forceps:    Vacuum:    Breech:    Observed anomalies      Living?:           APGARS  One minute Five minutes Ten minutes   Skin color:         Heart rate:         Grimace:         Muscle tone:         Breathing:         Totals: 8  9        Placenta: Delivered:       appearance     JOHN Gaston Boy Keondria [[id number]]     Delivery:    Episiotomy: None   Lacerations:     Repair suture:     Repair # of packets:     Blood loss (ml):       Birth information:  YOB: 2021   Time of birth: 5:50 PM   Sex: male   Delivery type: , Low Transverse   Gestational Age: 38w1d    Delivery Clinician:      Other providers:       Additional  information:  Forceps:    Vacuum:    Breech:    Observed anomalies      Living?:           APGARS  One minute Five minutes Ten minutes   Skin color:         Heart rate:         Grimace:         Muscle tone:         Breathing:         Totals: 7  9        Placenta: Delivered:       appearance      Patient Instructions:   Current Discharge Medication List      START taking these medications    Details   HYDROcodone-acetaminophen (NORCO) 5-325 mg per tablet Take 1 tablet by mouth every 4 (four) hours as needed.  Qty: 20 tablet, Refills: 0    Comments: Quantity prescribed more than 7 day supply? Yes, quantity medically necessary      ibuprofen (ADVIL,MOTRIN) 600 MG tablet Take 1 tablet (600 mg total) by mouth every 6 (six) hours.  Qty: 30 tablet, Refills: 1         CONTINUE these medications which have NOT  CHANGED    Details   magnesium oxide (MAG-OX) 400 mg (241.3 mg magnesium) tablet Take 1 tablet (400 mg total) by mouth once daily.  Qty: 90 tablet, Refills: 3    Associated Diagnoses: Frontal headache      pantoprazole (PROTONIX) 40 MG tablet Take 1 tablet (40 mg total) by mouth once daily.  Qty: 30 tablet, Refills: 1      prenatal 21-iron fu-folic acid (PRENATAL COMPLETE) 14 mg iron- 400 mcg Tab Take 1 tablet by mouth once daily.  Qty: 90 tablet, Refills: 4      PROVENTIL HFA 90 mcg/actuation inhaler Inhale 1-2 puffs into the lungs every 4 (four) hours as needed for Wheezing.  Qty: 18 g, Refills: 2    Associated Diagnoses: Asthma complicating pregnancy, antepartum      riboflavin, vitamin B2, (VITAMIN B-2) 100 mg Tab tablet Take 2 tablets (200 mg total) by mouth once daily.  Qty: 180 tablet, Refills: 3    Associated Diagnoses: Frontal headache      SINGULAIR 10 mg tablet Take 1 tablet (10 mg total) by mouth every evening.  Qty: 30 tablet, Refills: 4    Associated Diagnoses: Asthma complicating pregnancy, antepartum             Discharge Procedure Orders   Diet Adult Regular     Pelvic Rest   Order Comments: Pelvic rest until 6 weeks after discharge. Nothing in vagina -no sex, tampons, douching, etc.     Notify your health care provider if you experience any of the following:  temperature >100.4     Notify your health care provider if you experience any of the following:  persistent nausea and vomiting or diarrhea     Notify your health care provider if you experience any of the following:  severe uncontrolled pain     Notify your health care provider if you experience any of the following:  redness, tenderness, or signs of infection (pain, swelling, redness, odor or green/yellow discharge around incision site)     Notify your health care provider if you experience any of the following:  difficulty breathing or increased cough     Notify your health care provider if you experience any of the following:  severe  persistent headache     Notify your health care provider if you experience any of the following:  worsening rash     Notify your health care provider if you experience any of the following:  persistent dizziness, light-headedness, or visual disturbances     Notify your health care provider if you experience any of the following:  increased confusion or weakness     Notify your health care provider if you experience any of the following:   Order Comments: Heavy vaginal bleeding saturating more than 1 pad per hr for at least consecutive 2 hrs.     No driving until:   Order Comments: No driving until not taking narcotic pain medication.     Activity as tolerated       Follow-up Information     Radha Arenas DO. Schedule an appointment as soon as possible for a visit in 6 weeks.    Specialty: Obstetrics and Gynecology  Why: Postpartum check  Contact information:  [address] [id number] [phone]                    Abena Rivers MD/MPH  OB/GYN PGY1

## 2020-11-14 NOTE — PROGRESS NOTES
POSTPARTUM PROGRESS NOTE     Liliya Gaston is a 25 y.o. female POD #3 status post Repeat  section and BTL at 38w1d in a pregnancy complicated by di/di twin pregnancy, h/o csection, h/o of trichomonas, mild intermittent asthma, GBS+, Anemia, MO, h/o migraines.    Patient is doing well this morning. She denies nausea, vomiting, fever or chills. Patient reports moderate abdominal pain that is moderately relieved by oral pain medications. Lochia is mild to moderate and stable. Patient is voiding without difficulty and ambulating with no difficulty. She has passed flatus.    When asked if I could evaluate her incision patient states  she does not feel like lifting up her belly for it to be looked at right now. Says she will allow the attending to look at her later on.     Patient does plan to breast feed. S/p BTL for contraception. She desires circumcision for baby boy.     Objective:       Temp:  [97.7 °F (36.5 °C)-98.2 °F (36.8 °C)] 98.1 °F (36.7 °C)  Pulse:  [] 101  Resp:  [16-18] 18  SpO2:  [95 %-100 %] 97 %  BP: (117-134)/(60-76) 133/75    General:   alert, appears stated age and cooperative   Lungs:   Non-labored respirations    Heart:   regular rate and rhythm   Abdomen:  Soft, nondistended    Uterus:  firm located at the umbilicus.    Incision: Not evaluated as stated above   Extremities: no pedal edema noted     Lab Review  No results found for this or any previous visit (from the past 4 hour(s)).    I/O  No intake or output data in the 24 hours ending 20 0143     Assessment:     Patient Active Problem List   Diagnosis    Supervision of normal pregnancy    Asthma complicating pregnancy, antepartum    Obesity complicating pregnancy, third trimester    Dichorionic diamniotic twin pregnancy in third trimester    Acute bilateral low back pain without sciatica    Decreased functional mobility and endurance    Echogenic intracardiac focus of fetus on prenatal ultrasound    Status  post  delivery    Positive GBS test        Plan:   1. Postpartum care:  - Patient doing well. Continue routine management and advances.  - Continue PO pain meds. Pain well controlled.  - Heme: H/h  >>>   - Encourage ambulation  - Circumcision consents signed, orders placed  - Contraception to be discussed at 6 week pp visit  - Lactation consult PRN  - Rh +    2. History of trichomonas in T1  - Detected on 2020  - Negative STARLA on 2020     3.  Mild intermittent asthma  - Asymptomatic  - SpO2:  [97 %-100 %] 97 %    - Has not used any albuterol this pregnancy, no history of ICU admissions or intubations  - Monitor closely      4. Anemia  - Asymptomatic  - H/H as above     5. History of migraines  - Stable, asymptomatic  - Fioricet PRN  - Monitor blood pressures closely     6. MO (BMI 57)   - Lovenox in post partum period as patient's pre pregnancy BMI was >45    7. Maternal/fetal tachycardia  - Noted during delivery  - clinical signs of infection however no recorded fever  - s/p amp/gent/clinda    Dispo: As patient meets milestones, will plan to discharge POD #3-4.    Candace Hsieh MD   PGY-1, OB-GYN

## 2020-11-14 NOTE — PROGRESS NOTES
HTN education given with handout, pt states understanding and will follow up with walk in clinic for BP check on Wednesday NOV 18,

## 2020-11-16 ENCOUNTER — TELEPHONE (OUTPATIENT)
Dept: OBSTETRICS AND GYNECOLOGY | Facility: CLINIC | Age: 25
End: 2020-11-16

## 2020-11-16 DIAGNOSIS — R60.9 SWELLING: Primary | ICD-10-CM

## 2020-11-16 RX ORDER — FUROSEMIDE 20 MG/1
20 TABLET ORAL DAILY
Qty: 2 TABLET | Refills: 0 | Status: SHIPPED | OUTPATIENT
Start: 2020-11-16 | End: 2020-11-18

## 2020-11-16 NOTE — TELEPHONE ENCOUNTER
Called patient to discuss swelling.   Feet,  ankles, wrists, reports some pain at where IV site was.   Reports some headaches and muscular back pain.    Denies elevations in BP.

## 2020-11-16 NOTE — TELEPHONE ENCOUNTER
----- Message from Maria De Jesus Cazares sent at 11/16/2020  9:32 AM CST -----  Type: Patient Call Back    Who called: pt     What is the request in detail: pt asking call back from nurse. Pt declined to leave reason for call.     Can the clinic reply by [facility]? No     Would the patient rather a call back or a response via [facility]? Call back     Best call back number: [phone] (mobile)    Additional Information:       Thank You        Returned patient called, patient states she is swollen and it hurts. Patient states her hands, feet and legs are swollen. Inform patient she may need to come to get checked, but patient decline. Inform patient Dr Arenas was out of the office this morning. Patient will call back this afternoon to speak with Dr Arenas.

## 2020-11-17 LAB
FINAL PATHOLOGIC DIAGNOSIS: NORMAL
GROSS: NORMAL
Lab: NORMAL

## 2020-12-04 ENCOUNTER — TELEPHONE (OUTPATIENT)
Dept: OBSTETRICS AND GYNECOLOGY | Facility: CLINIC | Age: 25
End: 2020-12-04

## 2020-12-04 NOTE — TELEPHONE ENCOUNTER
Spoke with patient regarding  scar itching. Inform patient its healing and inform patient she can try taking benadryl by mouth. Patient also schedule pp visit. Patient verbalized and understand

## 2020-12-14 NOTE — PROGRESS NOTES
Doing well, though remains very uncomfortable.   Still plans for , plan for induction 38 weeks.  All precautions reviewed.  F/U 1 week and for PNT.

## 2021-01-07 ENCOUNTER — POSTPARTUM VISIT (OUTPATIENT)
Dept: OBSTETRICS AND GYNECOLOGY | Facility: CLINIC | Age: 26
End: 2021-01-07
Attending: OBSTETRICS & GYNECOLOGY
Payer: MEDICAID

## 2021-01-07 VITALS — HEIGHT: 64 IN | BODY MASS INDEX: 54.82 KG/M2 | DIASTOLIC BLOOD PRESSURE: 84 MMHG | SYSTOLIC BLOOD PRESSURE: 122 MMHG

## 2021-01-07 DIAGNOSIS — Z30.9 ENCOUNTER FOR CONTRACEPTIVE MANAGEMENT, UNSPECIFIED TYPE: ICD-10-CM

## 2021-01-07 DIAGNOSIS — B37.9 YEAST INFECTION: ICD-10-CM

## 2021-01-07 PROCEDURE — 99213 OFFICE O/P EST LOW 20 MIN: CPT | Mod: PBBFAC | Performed by: OBSTETRICS & GYNECOLOGY

## 2021-01-07 PROCEDURE — 99999 PR PBB SHADOW E&M-EST. PATIENT-LVL III: ICD-10-PCS | Mod: PBBFAC,,, | Performed by: OBSTETRICS & GYNECOLOGY

## 2021-01-07 PROCEDURE — 0503F PR POSTPARTUM CARE VISIT: ICD-10-PCS | Mod: ,,, | Performed by: OBSTETRICS & GYNECOLOGY

## 2021-01-07 PROCEDURE — 0503F POSTPARTUM CARE VISIT: CPT | Mod: ,,, | Performed by: OBSTETRICS & GYNECOLOGY

## 2021-01-07 PROCEDURE — 99999 PR PBB SHADOW E&M-EST. PATIENT-LVL III: CPT | Mod: PBBFAC,,, | Performed by: OBSTETRICS & GYNECOLOGY

## 2021-01-07 RX ORDER — FLUCONAZOLE 150 MG/1
150 TABLET ORAL ONCE
Qty: 4 TABLET | Refills: 2 | Status: SHIPPED | OUTPATIENT
Start: 2021-01-07 | End: 2021-01-07

## 2021-04-05 ENCOUNTER — TELEPHONE (OUTPATIENT)
Dept: OBSTETRICS AND GYNECOLOGY | Facility: CLINIC | Age: 26
End: 2021-04-05

## 2021-04-05 DIAGNOSIS — Z30.9 ENCOUNTER FOR CONTRACEPTIVE MANAGEMENT, UNSPECIFIED TYPE: Primary | ICD-10-CM

## 2021-04-16 ENCOUNTER — PATIENT MESSAGE (OUTPATIENT)
Dept: RESEARCH | Facility: HOSPITAL | Age: 26
End: 2021-04-16

## 2021-10-01 PROBLEM — Z04.89 ENCOUNTER FOR EXAMINATION AND OBSERVATION FOR OTHER SPECIFIED REASONS: Status: RESOLVED | Noted: 2020-09-16 | Resolved: 2020-10-23
